# Patient Record
Sex: MALE | Race: WHITE | NOT HISPANIC OR LATINO | URBAN - METROPOLITAN AREA
[De-identification: names, ages, dates, MRNs, and addresses within clinical notes are randomized per-mention and may not be internally consistent; named-entity substitution may affect disease eponyms.]

---

## 2017-05-22 PROBLEM — D12.6 TUBULAR ADENOMA OF COLON: Status: ACTIVE | Noted: 2017-05-22

## 2017-08-10 ENCOUNTER — APPOINTMENT (RX ONLY)
Dept: URBAN - METROPOLITAN AREA CLINIC 24 | Facility: CLINIC | Age: 67
Setting detail: DERMATOLOGY
End: 2017-08-10

## 2017-08-10 DIAGNOSIS — L82.1 OTHER SEBORRHEIC KERATOSIS: ICD-10-CM

## 2017-08-10 DIAGNOSIS — D18.0 HEMANGIOMA: ICD-10-CM

## 2017-08-10 DIAGNOSIS — L57.0 ACTINIC KERATOSIS: ICD-10-CM

## 2017-08-10 DIAGNOSIS — L81.4 OTHER MELANIN HYPERPIGMENTATION: ICD-10-CM

## 2017-08-10 DIAGNOSIS — D22 MELANOCYTIC NEVI: ICD-10-CM

## 2017-08-10 PROBLEM — D22.5 MELANOCYTIC NEVI OF TRUNK: Status: ACTIVE | Noted: 2017-08-10

## 2017-08-10 PROBLEM — L85.3 XEROSIS CUTIS: Status: ACTIVE | Noted: 2017-08-10

## 2017-08-10 PROBLEM — D18.01 HEMANGIOMA OF SKIN AND SUBCUTANEOUS TISSUE: Status: ACTIVE | Noted: 2017-08-10

## 2017-08-10 PROCEDURE — 99213 OFFICE O/P EST LOW 20 MIN: CPT | Mod: 25

## 2017-08-10 PROCEDURE — 17000 DESTRUCT PREMALG LESION: CPT

## 2017-08-10 PROCEDURE — 17003 DESTRUCT PREMALG LES 2-14: CPT

## 2017-08-10 PROCEDURE — ? COUNSELING

## 2017-08-10 PROCEDURE — ? LIQUID NITROGEN

## 2017-08-10 ASSESSMENT — LOCATION SIMPLE DESCRIPTION DERM
LOCATION SIMPLE: LEFT SHOULDER
LOCATION SIMPLE: RIGHT THIGH
LOCATION SIMPLE: ABDOMEN
LOCATION SIMPLE: RIGHT LOWER BACK
LOCATION SIMPLE: RIGHT SHOULDER
LOCATION SIMPLE: LEFT THIGH
LOCATION SIMPLE: LEFT UPPER BACK
LOCATION SIMPLE: RIGHT HAND

## 2017-08-10 ASSESSMENT — LOCATION DETAILED DESCRIPTION DERM
LOCATION DETAILED: RIGHT SUPERIOR MEDIAL MIDBACK
LOCATION DETAILED: RIGHT POSTERIOR SHOULDER
LOCATION DETAILED: RIGHT ANTERIOR DISTAL THIGH
LOCATION DETAILED: LEFT MEDIAL UPPER BACK
LOCATION DETAILED: LEFT ANTERIOR DISTAL THIGH
LOCATION DETAILED: LEFT POSTERIOR SHOULDER
LOCATION DETAILED: RIGHT RADIAL DORSAL HAND
LOCATION DETAILED: LEFT LATERAL ABDOMEN

## 2017-08-10 ASSESSMENT — LOCATION ZONE DERM
LOCATION ZONE: ARM
LOCATION ZONE: LEG
LOCATION ZONE: TRUNK
LOCATION ZONE: HAND

## 2017-08-10 NOTE — PROCEDURE: LIQUID NITROGEN
Render Post-Care Instructions In Note?: no
Post-Care Instructions: I reviewed with the patient in detail post-care instructions. Patient is to wear sunprotection, and avoid picking at any of the treated lesions. Pt may apply Vaseline to crusted or scabbing areas. Will re-check at follow up
Number Of Freeze-Thaw Cycles: 1 freeze-thaw cycle
Consent: The patient's verbal consent was obtained including but not limited to risks of crusting, scabbing, blistering, scarring, darker or lighter pigmentary change, recurrence, incomplete removal and infection.
Detail Level: Detailed
Duration Of Freeze Thaw-Cycle (Seconds): 5

## 2018-05-10 ENCOUNTER — APPOINTMENT (RX ONLY)
Dept: URBAN - METROPOLITAN AREA CLINIC 24 | Facility: CLINIC | Age: 68
Setting detail: DERMATOLOGY
End: 2018-05-10

## 2018-05-10 DIAGNOSIS — L81.4 OTHER MELANIN HYPERPIGMENTATION: ICD-10-CM

## 2018-05-10 DIAGNOSIS — D18.0 HEMANGIOMA: ICD-10-CM

## 2018-05-10 DIAGNOSIS — L82.1 OTHER SEBORRHEIC KERATOSIS: ICD-10-CM

## 2018-05-10 PROBLEM — D18.01 HEMANGIOMA OF SKIN AND SUBCUTANEOUS TISSUE: Status: ACTIVE | Noted: 2018-05-10

## 2018-05-10 PROCEDURE — 99214 OFFICE O/P EST MOD 30 MIN: CPT

## 2018-05-10 PROCEDURE — ? COUNSELING

## 2018-05-10 ASSESSMENT — LOCATION SIMPLE DESCRIPTION DERM
LOCATION SIMPLE: RIGHT SHOULDER
LOCATION SIMPLE: RIGHT HAND
LOCATION SIMPLE: UPPER BACK
LOCATION SIMPLE: ABDOMEN
LOCATION SIMPLE: LEFT SHOULDER
LOCATION SIMPLE: LEFT HAND
LOCATION SIMPLE: RIGHT FOREARM
LOCATION SIMPLE: LEFT FOREARM

## 2018-05-10 ASSESSMENT — LOCATION ZONE DERM
LOCATION ZONE: TRUNK
LOCATION ZONE: HAND
LOCATION ZONE: ARM

## 2018-05-10 ASSESSMENT — LOCATION DETAILED DESCRIPTION DERM
LOCATION DETAILED: LEFT POSTERIOR SHOULDER
LOCATION DETAILED: RIGHT PROXIMAL DORSAL FOREARM
LOCATION DETAILED: LEFT PROXIMAL DORSAL FOREARM
LOCATION DETAILED: INFERIOR THORACIC SPINE
LOCATION DETAILED: LEFT LATERAL ABDOMEN
LOCATION DETAILED: LEFT ULNAR DORSAL HAND
LOCATION DETAILED: RIGHT RADIAL DORSAL HAND
LOCATION DETAILED: RIGHT POSTERIOR SHOULDER

## 2018-08-29 PROBLEM — K57.30 DIVERTICULOSIS OF LARGE INTESTINE WITHOUT HEMORRHAGE: Status: ACTIVE | Noted: 2018-08-29

## 2018-08-29 PROBLEM — K59.01 SLOW TRANSIT CONSTIPATION: Status: ACTIVE | Noted: 2018-08-29

## 2018-08-29 PROBLEM — Z71.85 VACCINE COUNSELING: Status: ACTIVE | Noted: 2018-08-29

## 2018-12-26 ENCOUNTER — ANESTHESIA EVENT (OUTPATIENT)
Dept: SURGERY | Age: 68
End: 2018-12-26
Payer: COMMERCIAL

## 2018-12-26 RX ORDER — PHENYLEPHRINE HYDROCHLORIDE 25 MG/ML
1 SOLUTION/ DROPS OPHTHALMIC
Status: CANCELLED | OUTPATIENT
Start: 2018-12-26 | End: 2018-12-26

## 2018-12-26 RX ORDER — CYCLOPENTOLATE HYDROCHLORIDE 20 MG/ML
1 SOLUTION/ DROPS OPHTHALMIC
Status: CANCELLED | OUTPATIENT
Start: 2018-12-26 | End: 2018-12-26

## 2018-12-27 ENCOUNTER — HOSPITAL ENCOUNTER (OUTPATIENT)
Age: 68
Setting detail: OUTPATIENT SURGERY
Discharge: HOME | End: 2018-12-27
Attending: OPHTHALMOLOGY | Admitting: OPHTHALMOLOGY
Payer: COMMERCIAL

## 2018-12-27 ENCOUNTER — ANESTHESIA (OUTPATIENT)
Dept: SURGERY | Age: 68
End: 2018-12-27
Payer: COMMERCIAL

## 2018-12-27 VITALS
DIASTOLIC BLOOD PRESSURE: 86 MMHG | RESPIRATION RATE: 12 BRPM | BODY MASS INDEX: 25.66 KG/M2 | WEIGHT: 184 LBS | TEMPERATURE: 98.2 F | SYSTOLIC BLOOD PRESSURE: 142 MMHG | OXYGEN SATURATION: 98 % | HEART RATE: 68 BPM

## 2018-12-27 PROCEDURE — 77030018846 HC SOL IRR STRL H20 ICUM -A: Performed by: OPHTHALMOLOGY

## 2018-12-27 PROCEDURE — 77030017621 HC NDL OPHTH1 ALCN -B: Performed by: OPHTHALMOLOGY

## 2018-12-27 PROCEDURE — 77030008546 HC TBNG OPHTH ALCN -B: Performed by: OPHTHALMOLOGY

## 2018-12-27 PROCEDURE — 74011000250 HC RX REV CODE- 250: Performed by: OPHTHALMOLOGY

## 2018-12-27 PROCEDURE — 77030018837 HC SOL IRR OPTH ALCN -A: Performed by: OPHTHALMOLOGY

## 2018-12-27 PROCEDURE — 76060000033 HC ANESTHESIA 1 TO 1.5 HR: Performed by: OPHTHALMOLOGY

## 2018-12-27 PROCEDURE — 77030018838 HC SOL IRR OPTH ALCN -B: Performed by: OPHTHALMOLOGY

## 2018-12-27 PROCEDURE — 77030012829 HC CAUT BPLR KIRW -B: Performed by: OPHTHALMOLOGY

## 2018-12-27 PROCEDURE — 74011250636 HC RX REV CODE- 250/636: Performed by: ANESTHESIOLOGY

## 2018-12-27 PROCEDURE — 77030003029 HC SUT VCRL J&J -B: Performed by: OPHTHALMOLOGY

## 2018-12-27 PROCEDURE — 76210000063 HC OR PH I REC FIRST 0.5 HR: Performed by: OPHTHALMOLOGY

## 2018-12-27 PROCEDURE — 74011250636 HC RX REV CODE- 250/636: Performed by: OPHTHALMOLOGY

## 2018-12-27 PROCEDURE — 76210000020 HC REC RM PH II FIRST 0.5 HR: Performed by: OPHTHALMOLOGY

## 2018-12-27 PROCEDURE — 76010000149 HC OR TIME 1 TO 1.5 HR: Performed by: OPHTHALMOLOGY

## 2018-12-27 PROCEDURE — 74011250636 HC RX REV CODE- 250/636

## 2018-12-27 PROCEDURE — 77030032623 HC KT VITRCMY TOT PLS ALCN -F: Performed by: OPHTHALMOLOGY

## 2018-12-27 PROCEDURE — 77030008547 HC TBNG OPHTH BD -A: Performed by: OPHTHALMOLOGY

## 2018-12-27 RX ORDER — SODIUM CHLORIDE 0.9 % (FLUSH) 0.9 %
5-10 SYRINGE (ML) INJECTION AS NEEDED
Status: DISCONTINUED | OUTPATIENT
Start: 2018-12-27 | End: 2018-12-27 | Stop reason: HOSPADM

## 2018-12-27 RX ORDER — PROPOFOL 10 MG/ML
INJECTION, EMULSION INTRAVENOUS AS NEEDED
Status: DISCONTINUED | OUTPATIENT
Start: 2018-12-27 | End: 2018-12-27 | Stop reason: HOSPADM

## 2018-12-27 RX ORDER — LIDOCAINE HYDROCHLORIDE 10 MG/ML
0.1 INJECTION INFILTRATION; PERINEURAL AS NEEDED
Status: DISCONTINUED | OUTPATIENT
Start: 2018-12-27 | End: 2018-12-27 | Stop reason: HOSPADM

## 2018-12-27 RX ORDER — BUPIVACAINE HYDROCHLORIDE 5 MG/ML
INJECTION, SOLUTION EPIDURAL; INTRACAUDAL AS NEEDED
Status: DISCONTINUED | OUTPATIENT
Start: 2018-12-27 | End: 2018-12-27 | Stop reason: HOSPADM

## 2018-12-27 RX ORDER — OXYCODONE HYDROCHLORIDE 5 MG/1
5 TABLET ORAL
Status: DISCONTINUED | OUTPATIENT
Start: 2018-12-27 | End: 2018-12-27 | Stop reason: HOSPADM

## 2018-12-27 RX ORDER — CYCLOPENTOLATE HYDROCHLORIDE 20 MG/ML
1 SOLUTION/ DROPS OPHTHALMIC
Status: COMPLETED | OUTPATIENT
Start: 2018-12-27 | End: 2018-12-27

## 2018-12-27 RX ORDER — MIDAZOLAM HYDROCHLORIDE 1 MG/ML
2 INJECTION, SOLUTION INTRAMUSCULAR; INTRAVENOUS
Status: DISCONTINUED | OUTPATIENT
Start: 2018-12-27 | End: 2018-12-27 | Stop reason: HOSPADM

## 2018-12-27 RX ORDER — BETAMETHASONE SODIUM PHOSPHATE AND BETAMETHASONE ACETATE 3; 3 MG/ML; MG/ML
INJECTION, SUSPENSION INTRA-ARTICULAR; INTRALESIONAL; INTRAMUSCULAR; SOFT TISSUE AS NEEDED
Status: DISCONTINUED | OUTPATIENT
Start: 2018-12-27 | End: 2018-12-27 | Stop reason: HOSPADM

## 2018-12-27 RX ORDER — SODIUM CHLORIDE, SODIUM LACTATE, POTASSIUM CHLORIDE, CALCIUM CHLORIDE 600; 310; 30; 20 MG/100ML; MG/100ML; MG/100ML; MG/100ML
75 INJECTION, SOLUTION INTRAVENOUS CONTINUOUS
Status: DISCONTINUED | OUTPATIENT
Start: 2018-12-27 | End: 2018-12-27 | Stop reason: HOSPADM

## 2018-12-27 RX ORDER — PHENYLEPHRINE HYDROCHLORIDE 25 MG/ML
1 SOLUTION/ DROPS OPHTHALMIC
Status: COMPLETED | OUTPATIENT
Start: 2018-12-27 | End: 2018-12-27

## 2018-12-27 RX ORDER — ALBUTEROL SULFATE 0.83 MG/ML
2.5 SOLUTION RESPIRATORY (INHALATION) AS NEEDED
Status: DISCONTINUED | OUTPATIENT
Start: 2018-12-27 | End: 2018-12-27 | Stop reason: HOSPADM

## 2018-12-27 RX ORDER — SODIUM CHLORIDE 0.9 % (FLUSH) 0.9 %
5-10 SYRINGE (ML) INJECTION EVERY 8 HOURS
Status: DISCONTINUED | OUTPATIENT
Start: 2018-12-27 | End: 2018-12-27 | Stop reason: HOSPADM

## 2018-12-27 RX ORDER — CEFAZOLIN SODIUM 1 G/3ML
INJECTION, POWDER, FOR SOLUTION INTRAMUSCULAR; INTRAVENOUS AS NEEDED
Status: DISCONTINUED | OUTPATIENT
Start: 2018-12-27 | End: 2018-12-27 | Stop reason: HOSPADM

## 2018-12-27 RX ORDER — HYDROMORPHONE HYDROCHLORIDE 2 MG/ML
0.5 INJECTION, SOLUTION INTRAMUSCULAR; INTRAVENOUS; SUBCUTANEOUS
Status: DISCONTINUED | OUTPATIENT
Start: 2018-12-27 | End: 2018-12-27 | Stop reason: HOSPADM

## 2018-12-27 RX ORDER — MIDAZOLAM HYDROCHLORIDE 1 MG/ML
2 INJECTION, SOLUTION INTRAMUSCULAR; INTRAVENOUS ONCE
Status: DISCONTINUED | OUTPATIENT
Start: 2018-12-27 | End: 2018-12-27 | Stop reason: HOSPADM

## 2018-12-27 RX ORDER — FENTANYL CITRATE 50 UG/ML
100 INJECTION, SOLUTION INTRAMUSCULAR; INTRAVENOUS ONCE
Status: DISCONTINUED | OUTPATIENT
Start: 2018-12-27 | End: 2018-12-27 | Stop reason: HOSPADM

## 2018-12-27 RX ORDER — LIDOCAINE HYDROCHLORIDE 20 MG/ML
INJECTION, SOLUTION INFILTRATION; PERINEURAL AS NEEDED
Status: DISCONTINUED | OUTPATIENT
Start: 2018-12-27 | End: 2018-12-27 | Stop reason: HOSPADM

## 2018-12-27 RX ORDER — SODIUM CHLORIDE 9 MG/ML
INJECTION INTRAMUSCULAR; INTRAVENOUS; SUBCUTANEOUS AS NEEDED
Status: DISCONTINUED | OUTPATIENT
Start: 2018-12-27 | End: 2018-12-27 | Stop reason: HOSPADM

## 2018-12-27 RX ORDER — SODIUM CHLORIDE, SODIUM LACTATE, POTASSIUM CHLORIDE, CALCIUM CHLORIDE 600; 310; 30; 20 MG/100ML; MG/100ML; MG/100ML; MG/100ML
50 INJECTION, SOLUTION INTRAVENOUS CONTINUOUS
Status: DISCONTINUED | OUTPATIENT
Start: 2018-12-27 | End: 2018-12-27 | Stop reason: HOSPADM

## 2018-12-27 RX ADMIN — PROPOFOL 50 MG: 10 INJECTION, EMULSION INTRAVENOUS at 15:23

## 2018-12-27 RX ADMIN — PROPOFOL 10 MG: 10 INJECTION, EMULSION INTRAVENOUS at 14:33

## 2018-12-27 RX ADMIN — PROPOFOL 50 MG: 10 INJECTION, EMULSION INTRAVENOUS at 14:28

## 2018-12-27 RX ADMIN — PROPOFOL 20 MG: 10 INJECTION, EMULSION INTRAVENOUS at 14:31

## 2018-12-27 RX ADMIN — SODIUM CHLORIDE, SODIUM LACTATE, POTASSIUM CHLORIDE, AND CALCIUM CHLORIDE 75 ML/HR: 600; 310; 30; 20 INJECTION, SOLUTION INTRAVENOUS at 12:58

## 2018-12-27 RX ADMIN — CYCLOPENTOLATE HYDROCHLORIDE 1 DROP: 20 SOLUTION/ DROPS OPHTHALMIC at 13:19

## 2018-12-27 RX ADMIN — PHENYLEPHRINE HYDROCHLORIDE 1 DROP: 25 SOLUTION/ DROPS OPHTHALMIC at 13:41

## 2018-12-27 RX ADMIN — CYCLOPENTOLATE HYDROCHLORIDE 1 DROP: 20 SOLUTION/ DROPS OPHTHALMIC at 12:58

## 2018-12-27 RX ADMIN — CYCLOPENTOLATE HYDROCHLORIDE 1 DROP: 20 SOLUTION/ DROPS OPHTHALMIC at 13:41

## 2018-12-27 RX ADMIN — PROPOFOL 30 MG: 10 INJECTION, EMULSION INTRAVENOUS at 14:32

## 2018-12-27 RX ADMIN — PHENYLEPHRINE HYDROCHLORIDE 1 DROP: 25 SOLUTION/ DROPS OPHTHALMIC at 13:19

## 2018-12-27 RX ADMIN — PROPOFOL 30 MG: 10 INJECTION, EMULSION INTRAVENOUS at 14:30

## 2018-12-27 RX ADMIN — PHENYLEPHRINE HYDROCHLORIDE 1 DROP: 25 SOLUTION/ DROPS OPHTHALMIC at 12:58

## 2018-12-27 RX ADMIN — PROPOFOL 50 MG: 10 INJECTION, EMULSION INTRAVENOUS at 14:29

## 2018-12-27 NOTE — OP NOTES
[facility] REPORT    Sandra Gerardo  MR#: [id number]  : 1950  ACCOUNT #: [de-identified]   DATE OF SERVICE: 2018    PREOPERATIVE DIAGNOSES:  1. Rhegmatogenous retinal detachment, macula on, left eye. 2.  Vitreous hemorrhage, left eye. 3.  Pseudophakia. POSTOPERATIVE DIAGNOSES:  1. Rhegmatogenous retinal detachment, macula on, left eye. 2.  Vitreous hemorrhage, left eye. 3.  Pseudophakia. PROCEDURE PERFORMED:  Pars plana vitrectomy to repair retinal detachment with vitrectomy, laser and gas fluid exchange, total of approximately 758 spots. SURGEON:  Hoang Brown MD    ASSISTANT:  0    ANESTHESIA:  Modified. COMPLICATIONS:  None. ESTIMATED BLOOD LOSS:  0    SPECIMENS REMOVED:  0    IMPLANTS:  0     INDICATIONS FOR THE PROCEDURE:  The patient had significant symptomatic vision loss associated with vitreous hemorrhage. He also had superonasal detachment. He had previous laser, superotemporal tear. There was some obscuration of the inferior retina with some concern regarding additional break formation due to the presence of inferior detachment. This had developed in his fellow eye, requiring scleral buckling. After discussing the options, risks and benefits, I felt the best course of action was to proceed with vitrectomy with gas fluid exchange to both release the hemorrhage, repair the detachment as well as the laser 360 degrees. The patient understood the risks and benefits and alternatives and elected to proceed. After adequate pre-evaluation and informed consent obtained, the patient was brought to the operative suite. IV access and sedation was obtained without difficulty. Modified [place] and retrobulbar injection was given.   Following preoperative timeout verifying the left eye as the operative site and an indirect ophthalmoscopy confirmed the presence of the preexisting hemorrhage with the supranasal detachment was approximately 2 clock  hours extending moderately posterior to the equator. The eye was prepped and draped in the usual fashion for retinal surgery. Additional subtenons anesthetic was given to the patient. Three separate 25-gauge cannulas were placed. Infusion verified and turned to 35 mmHg. Through these, the central vitreous removed, the hyaloid was already elevated. The blood was removed and the vitreous skirt was trimmed. Nerve and macula were healthy. Once the skirt had been trimmed out to the far anterior periphery, indirect ophthalmoscopy performed additional trimming. As the blood was removed inferiorly, the long spins flap tear at 6 o'clock as well as a smaller tear at approximately 5, there was no significant fluid associated with these. Traction was released from these as well as from the superior breaks. Once good traction was achieved and no additional breaks or tears could be seen with depressed examination, a laser was initially placed around the 2 inferior defects as well as in the periphery for 360 degrees. A single retinotomy was created at the high end of the detachment which extended from approximately 10 o'clock to 12 o'clock at the level of the equator and drainage of the subretinal fluid was achieved with fluid air exchange. Laser was then placed around the retinotomy as well as in the periphery for 360 degrees to treat the tear superiorly, the other 2 breaks in the areas of detachment, a single tear at 11 and operculated hole at approximately 10:30. Once good laser been achieved, one final drainage performed. A 40 mL exchange of 20% SF6 was carried out and the eye was left with a pressure of under 20 mmHg at the end the procedure after closing all sclerotomies with 8-0 Vicryl sutures. Subtenons Celestone and Ancef was given infranasally. The eye was patched with Maxitrol ointment as well as Alphagan drops.   He was taken to recovery in good condition after placement of the patch and shield. It should be noted he did become somewhat anxious as I was closing and so we did give an extra dose of propofol, but there were no issues associated with this.       Waynetta Sacks, MD [address] / [place]  D: 12/27/2018 15:42     T: 12/27/2018 16:52  JOB #: [id number]

## 2018-12-27 NOTE — BRIEF OP NOTE
BRIEF OPERATIVE NOTE    Date of Procedure: 12/27/2018   Preoperative Diagnosis: Retinal detachment of left eye with single break [H33.012]  Postoperative Diagnosis: Retinal detachment of left eye with single break [H33.012]    Procedure(s):  LEFT VITRECTOMY POSTERIOR 25 GAUGE/LASER  LEFT EYE GAS INJECTION/GAS EXCHANGE  Surgeon(s) and Role:     * Jaime Manjarrez MD - Primary         Surgical Assistant: 0    Surgical Staff:  Circ-1: Adan Leal RN  Circ-2: Lisa Quiñones RN  Scrub Tech-1: [place] [employer]  Event Time In Time Out   Incision Start 1439    Incision Close 1527      Anesthesia: MAC   Estimated Blood Loss: 0  Specimens: * No specimens in log *   Findings: 0   Complications: 0  Implants: * No implants in log *

## 2018-12-27 NOTE — ANESTHESIA POSTPROCEDURE EVALUATION
Procedure(s):  LEFT VITRECTOMY POSTERIOR 25 GAUGE/LASER  LEFT EYE GAS INJECTION/GAS EXCHANGE.     Anesthesia Post Evaluation        Patient location during evaluation: PACU  Patient participation: complete - patient participated  Level of consciousness: awake  Pain management: adequate  Airway patency: patent  Anesthetic complications: no  Cardiovascular status: acceptable  Respiratory status: spontaneous ventilation and acceptable  Hydration status: acceptable        Visit Vitals  /86   Pulse 68   Temp 36.8 °C (98.2 °F)   Resp 12   Wt 83.5 kg (184 lb)   SpO2 98%   BMI 25.66 kg/m²

## 2018-12-27 NOTE — ANESTHESIA PREPROCEDURE EVALUATION
Anesthetic History   No history of anesthetic complications            Review of Systems / Medical History  Patient summary reviewed and pertinent labs reviewed    Pulmonary  Within defined limits                 Neuro/Psych   Within defined limits           Cardiovascular              Hyperlipidemia    Exercise tolerance: >4 METS     GI/Hepatic/Renal  Within defined limits              Endo/Other  Within defined limits           Other Findings              Physical Exam    Airway  Mallampati: II  TM Distance: 4 - 6 cm  Neck ROM: normal range of motion   Mouth opening: Normal     Cardiovascular    Rhythm: regular  Rate: normal         Dental  No notable dental hx       Pulmonary  Breath sounds clear to auscultation               Abdominal         Other Findings            Anesthetic Plan    ASA: 1  Anesthesia type: total IV anesthesia          Induction: Intravenous  Anesthetic plan and risks discussed with: Patient and Spouse

## 2018-12-27 NOTE — DISCHARGE INSTRUCTIONS
[facility], [address] MD Chastity Guzman MD Murleen Mo. MD Jeimy Lobolla Pan. Madeleine Loaiza MD    [phone] or [phone] or [phone] or [phone]    Post Operative Instructions for Retina and Vitreous Surgery Patients    The following are a few guidelines that you should observe for two weeks after surgery:    1. Avoid stooping over, lifting heavy weights or bumping your head. 2.  Special positioning: Stay off back, sleep on left side looking down. 3.  No extensive traveling except what is necessary. Avoid air travel until you consult your doctor. 4.  Men may shave after the third day. 5.  You may watch television, read, cook and wash dishes as long as it does not interfere        with special positioning instructions. 6.  Alcoholic beverages may be used in moderation. 7.  No sexual intercourse. 8.  You  may bathe the eyelids daily with cotton or a tissue moistened with warm tap       water. Do not bathe the eyeball itself. 9.  Some discharge from the operated eye is to be expected. This should gradually        improve. 10. Change the eye pad at least once daily. You may discontinue the eye pad whenever        comfortable to do so (usually three days after the operation). Wear the eye shield or        glasses at all times. 11. If you experience increasing pain, decreasing vision, or pus like discharge, call the        Office. 12. Medication Instructions:         Prednisolone 1% - one drop in the operated eye 4 times a day. Ofloxacin(antibiotic drop) - one drop in operated eye 4 times a day. BRING ALL EYE DROPS TO YOU POSTOPERATIVE APPOINTMENT! Voiced or demonstrated understanding:  YES    TYPICAL SIDE EFFECTS OF PAIN MEDICATION:  *    Constipation: Drink lots of fluids, try prune juice. OTC stool softener if needed. *    Nausea: Take pain medication with food. ACTIVITY  · As tolerated and as directed by your doctor. · Bathe or shower as directed by your doctor. DIET  · Day of surgery: Clear liquids until no nausea or vomiting; small portion, light diet [place] foods (ex: baked chicken, plain rice, grits, scrambled eggs, toast). Nothing greasy, fried or spicy today. · Advance to regular diet on second day, unless your doctor orders otherwise. · If nausea and vomiting continues, call your doctor. PAIN  · Take pain medication as directed by your doctor. · DO NOT take aspirin or blood thinners unless directed by your doctor. AFTER ANESTHESIA   · For the first 24 hours: DO NOT Drive, Drink alcoholic beverages, or Make important decisions. · Be aware of dizziness following anesthesia and while taking pain medication. DISCHARGE SUMMARY from Nurse    PATIENT INSTRUCTIONS:    After general anesthesia or intravenous sedation, for 24 hours or while taking prescription Narcotics:  · Limit your activities  · Do not drive and operate hazardous machinery  · Do not make important personal or business decisions  · Do  not drink alcoholic beverages  · If you have not urinated within 8 hours after discharge, please contact your surgeon on call. *  Please give a list of your current medications to your Primary Care Provider. *  Please update this list whenever your medications are discontinued, doses are      changed, or new medications (including over-the-counter products) are added. *  Please carry medication information at all times in case of emergency situations. Preventing Infection at Home  We care about preventing infection and avoiding the spread of germs - not only when you are in the hospital but also when you return home.  When you return home from the hospital, its important to take the following steps to help prevent infection and avoid spreading germs that could infect you and others. Ask everyone in your home to follow these guidelines, too. Clean Your Hands  · Clean your hands whenever your hands are visibly dirty, before you eat, before or after touching your mouth, nose or eyes, and before preparing food. Clean them after contact with body fluids, using the restroom, touching animals or changing diapers. · When washing hands, wet them with warm water and work up a lather. Rub hands for at least 15 seconds, then rinse them and pat them dry with a clean towel or paper towel. · When using hand sanitizers, it should take about 15 seconds to rub your hands dry. If not, you probably didnt apply enough . Cover Your Sneeze or Cough  Germs are released into the air whenever you sneeze or cough. To prevent the spread of infection:  · Turn away from other people before coughing or sneezing. · Cover your mouth or nose with a tissue when you cough or sneeze. Put the tissue in the trash. · If you dont have a tissue, cough or sneeze into your upper sleeve, not your hands. · Always clean your hands after coughing or sneezing. Care for Wounds  Your skin is your bodys first line of defense against germs, but an open wound leaves an easy way for germs to enter your body. To prevent infection:  · Clean your hands before and after changing wound dressings, and wear gloves to change dressings if recommended by your doctor. · Take special care with IV lines or other devices inserted into the body. If you must touch them, clean your hands first.  · Follow any specific instructions from your doctor to care for your wounds. Contact your doctor if you experience any signs of infection, such as fever or increased redness at the surgical or wound site. Keep a Clean Home  · Clean or wipe commonly touched hard surfaces like door handles, sinks, tabletops, phones and TV remotes. · Use products labeled disinfectant to kill harmful bacteria and viruses.   · Use a clean cloth or paper towel to clean and dry surfaces. Wiping surfaces with a dirty dishcloth, sponge or towel will only spread germs. · Never share toothbrushes, orellana, drinking glasses, utensils, razor blades, face cloths or bath towels to avoid spreading germs. · Be sure that the linens that you sleep on are clean. · Keep pets away from wounds and wash your hands after touching pets, their toys or bedding. We care about you and your health. Remember, preventing infections is a team effort between you, your family, friends and health care providers. These are general instructions for a healthy lifestyle:    No smoking/ No tobacco products/ Avoid exposure to second hand smoke    Surgeon General's Warning:  Quitting smoking now greatly reduces serious risk to your health. Obesity, smoking, and sedentary lifestyle greatly increases your risk for illness    A healthy diet, regular physical exercise & weight monitoring are important for maintaining a healthy lifestyle    You may be retaining fluid if you have a history of heart failure or if you experience any of the following symptoms:  Weight gain of 3 pounds or more overnight or 5 pounds in a week, increased swelling in our hands or feet or shortness of breath while lying flat in bed. Please call your doctor as soon as you notice any of these symptoms; do not wait until your next office visit. Recognize signs and symptoms of STROKE:    F-face looks uneven    A-arms unable to move or move unevenly    S-speech slurred or non-existent    T-time-call 911 as soon as signs and symptoms begin-DO NOT go       Back to bed or wait to see if you get better-TIME IS BRAIN.

## 2019-05-09 ENCOUNTER — APPOINTMENT (RX ONLY)
Dept: URBAN - METROPOLITAN AREA CLINIC 24 | Facility: CLINIC | Age: 69
Setting detail: DERMATOLOGY
End: 2019-05-09

## 2019-05-09 DIAGNOSIS — L82.1 OTHER SEBORRHEIC KERATOSIS: ICD-10-CM

## 2019-05-09 DIAGNOSIS — D18.0 HEMANGIOMA: ICD-10-CM

## 2019-05-09 DIAGNOSIS — L72.8 OTHER FOLLICULAR CYSTS OF THE SKIN AND SUBCUTANEOUS TISSUE: ICD-10-CM

## 2019-05-09 DIAGNOSIS — L57.0 ACTINIC KERATOSIS: ICD-10-CM

## 2019-05-09 DIAGNOSIS — D22 MELANOCYTIC NEVI: ICD-10-CM

## 2019-05-09 DIAGNOSIS — L81.4 OTHER MELANIN HYPERPIGMENTATION: ICD-10-CM

## 2019-05-09 PROBLEM — D22.5 MELANOCYTIC NEVI OF TRUNK: Status: ACTIVE | Noted: 2019-05-09

## 2019-05-09 PROBLEM — L85.3 XEROSIS CUTIS: Status: ACTIVE | Noted: 2019-05-09

## 2019-05-09 PROBLEM — L55.1 SUNBURN OF SECOND DEGREE: Status: ACTIVE | Noted: 2019-05-09

## 2019-05-09 PROBLEM — D18.01 HEMANGIOMA OF SKIN AND SUBCUTANEOUS TISSUE: Status: ACTIVE | Noted: 2019-05-09

## 2019-05-09 PROCEDURE — 17000 DESTRUCT PREMALG LESION: CPT

## 2019-05-09 PROCEDURE — 17003 DESTRUCT PREMALG LES 2-14: CPT

## 2019-05-09 PROCEDURE — ? COUNSELING

## 2019-05-09 PROCEDURE — 99214 OFFICE O/P EST MOD 30 MIN: CPT | Mod: 25

## 2019-05-09 PROCEDURE — ? LIQUID NITROGEN

## 2019-05-09 ASSESSMENT — LOCATION ZONE DERM
LOCATION ZONE: AXILLAE
LOCATION ZONE: ARM
LOCATION ZONE: TRUNK
LOCATION ZONE: HAND

## 2019-05-09 ASSESSMENT — LOCATION DETAILED DESCRIPTION DERM
LOCATION DETAILED: RIGHT RADIAL DORSAL HAND
LOCATION DETAILED: LEFT PROXIMAL DORSAL FOREARM
LOCATION DETAILED: LEFT MEDIAL UPPER BACK
LOCATION DETAILED: RIGHT DISTAL DORSAL FOREARM
LOCATION DETAILED: LEFT DISTAL DORSAL FOREARM
LOCATION DETAILED: RIGHT POSTERIOR SHOULDER
LOCATION DETAILED: RIGHT PROXIMAL DORSAL FOREARM
LOCATION DETAILED: LEFT LATERAL ABDOMEN
LOCATION DETAILED: LEFT POSTERIOR SHOULDER
LOCATION DETAILED: LEFT AXILLARY VAULT
LOCATION DETAILED: LEFT ULNAR DORSAL HAND
LOCATION DETAILED: RIGHT DORSAL INDEX METACARPOPHALANGEAL JOINT

## 2019-05-09 ASSESSMENT — LOCATION SIMPLE DESCRIPTION DERM
LOCATION SIMPLE: RIGHT FOREARM
LOCATION SIMPLE: ABDOMEN
LOCATION SIMPLE: LEFT UPPER BACK
LOCATION SIMPLE: LEFT HAND
LOCATION SIMPLE: RIGHT SHOULDER
LOCATION SIMPLE: RIGHT HAND
LOCATION SIMPLE: LEFT SHOULDER
LOCATION SIMPLE: LEFT FOREARM
LOCATION SIMPLE: LEFT AXILLARY VAULT

## 2019-05-09 NOTE — PROCEDURE: LIQUID NITROGEN
Render Post-Care Instructions In Note?: no
Number Of Freeze-Thaw Cycles: 1 freeze-thaw cycle
Duration Of Freeze Thaw-Cycle (Seconds): 5
Post-Care Instructions: I reviewed with the patient in detail post-care instructions. Patient is to wear sunprotection, and avoid picking at any of the treated lesions. Pt may apply Vaseline to crusted or scabbing areas. Will re-check at follow up
Detail Level: Detailed
Consent: The patient's verbal consent was obtained including but not limited to risks of crusting, scabbing, blistering, scarring, darker or lighter pigmentary change, recurrence, incomplete removal and infection.

## 2019-06-06 ENCOUNTER — HOSPITAL ENCOUNTER (OUTPATIENT)
Dept: PHYSICAL THERAPY | Age: 69
Discharge: HOME | End: 2019-06-06
Payer: COMMERCIAL

## 2019-06-06 PROCEDURE — 97161 PT EVAL LOW COMPLEX 20 MIN: CPT

## 2019-06-06 NOTE — THERAPY EVALUATION
Victoria Hargrove  : 1950  Primary: [facility]yn [facility]  [place] Therapy Center at [facility]  [address] [place] [address]  [phone]   [phone]          OUTPATIENT PHYSICAL THERAPY:Initial Assessment 2019   ICD-10: Treatment Diagnosis: Cervicalgia, M54.2  Precautions/Allergies:   Celebrex [celecoxib] and Sulfa (sulfonamide antibiotics)   TREATMENT PLAN:  Effective Dates: 2019 TO 2019 (90 days). Frequency/Duration: 2 times a week for for 4 weeks then one time a week for 8 weeks MEDICAL/REFERRING DIAGNOSIS:  neck Facet arthropathy, DDD cervical region  DATE OF ONSET: chronic  REFERRING PHYSICIAN: Vicente Najera*  MD Orders: evaluate and treat  Return MD Appointment: 7/3/19     INITIAL ASSESSMENT:  Mr. Indu Day presents with cervical spine pain due to cervical joint limitation and restricted mobility. He shows significant loss of left rotation with increased pain down the right side of his upper back. He is a good candidate for skilled PT in order to address listed impairments affecting his function. Kelly Hernandez, PT, DPT, OCS      PROBLEM LIST (Impacting functional limitations):  1. Decreased Strength  2. Increased Pain  3. Decreased Activity Tolerance  4. Increased Fatigue  5. Decreased Flexibility/Joint Mobility INTERVENTIONS PLANNED: (Treatment may consist of any combination of the following)  1. Cold  2. Cryotherapy  3. Electrical Stimulation  4. Heat  5. Manual Therapy  6. Neuromuscular Re-education/Strengthening  7. Range of Motion (ROM)  8. Therapeutic Activites  9. Therapeutic Exercise/Strengthening  10. Traction     GOALS: (Goals have been discussed and agreed upon with patient.)  Short-Term Functional Goals: Time Frame: 4 weeks  1. Patient will show a greater than 5% improvement in left cervical rotation without pain increase  2.  Patient will show a greater than 1 finger increase in cervical flexion  3. Patient will be independent in [facility] for cervical mobility exercises  Discharge Goals: Time Frame: 12 weeks  1. Patient will report driving without pain increase in order to return to function  2. Patient will show a greater than 5 point decrease on the NDI in order to show an increase in function  3. Patient will return to golf without pain increase  4. Patient will be independent in cervical spine posture and stability training    OUTCOME MEASURE:   Tool Used: Neck Disability Index (NDI)  Score:  Initial: 7/50  Most Recent: X/50 (Date: -- )   Interpretation of Score: The Neck Disability Index is a revised form of the Oswestry Low Back Pain Index and is designed to measure the activities of daily living in person's with neck pain. Each section is scored on a 0-5 scale, 5 representing the greatest disability. The scores of each section are added together for a total score of 50. MEDICAL NECESSITY:   · Patient is expected to demonstrate progress in strength, range of motion and functional technique to decrease symptoms. .  · Patient demonstrates good rehab potential due to higher previous functional level. · Skilled intervention continues to be required due to increased pain. REASON FOR SERVICES/OTHER COMMENTS:  · Patient continues to require skilled intervention due to increased pain and dysfunction. Total Duration:  PT Patient Time In/Time Out  Time In: 1400  Time Out: 1500    Rehabilitation Potential For Stated Goals: Good  Regarding Mauri Pritchard's therapy, I certify that the treatment plan above will be carried out by a therapist or under their direction.   Thank you for this referral,  Adrian Brown, PT     Referring Physician Signature: [facility] D* _______________________________ Date _____________     PAIN/SUBJECTIVE:   Initial: Pain Intensity 1: 1 not much pain when not moving Post Session:  0/10   HISTORY:   History of Injury/Illness (Reason for Referral):  Patient reports chronic neck tightness with some pain, but started to have increased pain in early spring of this year with pain from the left shoulder blade into the neck. He feels it most with left rotation. He had previously had a fracture in his right shoulder of either the [facility] joint or shoulder itself that has limited some of his mobility, but not too much. X-rays shows increased disc degeneration in lower cervical spine. He does not report increased neurological signs or symptoms, but just a burning pain in the right side of his back and neck. The pain is inconsistent at this time. 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 Looking up also can cause his pain  Past Medical History/Comorbidities:   Mr. Saul Lima  has a past medical history of Detached retina (6/4/14), Detached retina (6/4/2014), Hyperlipidemia, and Tubular adenoma of colon (5/22/2017). Mr. Saul Lima  has a past surgical history that includes hx vein stripping (1982); hx colonoscopy (2008); hx knee arthroscopy (1999); hx cataract removal (12/09/2013); hx tonsillectomy; hx heent (1999); hx retinal detachment repair (Right, 5/6/14); and hx retinal detachment repair (02/05/2015). Social History/Living Environment:       Social History     Socioeconomic History    Marital status:      Spouse name: Not on file    Number of children: Not on file    Years of education: Not on file    Highest education level: Not on file   Occupational History    Not on file   Social Needs    Financial resource strain: Not on file    Food insecurity:     Worry: Not on file     Inability: Not on file    Transportation needs:     Medical: Not on file     Non-medical: Not on file   Tobacco Use    Smoking status: Never Smoker    Smokeless tobacco: Never Used   Substance and Sexual Activity    Alcohol use:  Yes     Alcohol/week: 8.0 oz     Types: 6 Cans of beer, 10 Shots of liquor per week    Drug use: No    Sexual activity: Not on file   Lifestyle    Physical activity:     Days per week: Not on file     Minutes per session: Not on file    Stress: Not on file   Relationships    Social connections:     Talks on phone: Not on file     Gets together: Not on file     Attends Buddhist service: Not on file     Active member of club or organization: Not on file     Attends meetings of clubs or organizations: Not on file     Relationship status: Not on file    Intimate partner violence:     Fear of current or ex partner: Not on file     Emotionally abused: Not on file     Physically abused: Not on file     Forced sexual activity: Not on file   Other Topics Concern    Not on file   Social History Narrative    Not on file         Prior Level of Function/Work/Activity:  Independent, retired  Dominant Side:         RIGHT   Ambulatory/Rehab Services H2 Model Falls Risk Assessment   Risk Factors:       (1)  Gender [Male] Ability to Rise from Chair:       (0)  Ability to rise in a single movement   Falls Prevention Plan:       No modifications necessary   Total: (5 or greater = High Risk): 1   ©2010 [facility] . [facility] States Patent #0,[id number]. Federal Law prohibits the replication, distribution or use without written permission from [facility] [employer]   Current Medications:       Current Outpatient Medications:     diclofenac (VOLTAREN) 1 % gel, Apply 4 g to affected area four (4) times daily. , Disp: 100 g, Rfl: 1    montelukast (SINGULAIR) 10 mg tablet, Take 1 Tab by mouth daily. , Disp: 90 Tab, Rfl: 3    polyethylene glycol (MIRALAX) 17 gram packet, Take 1 Packet by mouth daily. Indications: constipation, Disp: 1 Each, Rfl: 0    aspirin 81 mg chewable tablet, Take 81 mg by mouth daily. , Disp: , Rfl:    Date Last Reviewed:  6/7/2019     Number of Personal Factors/Comorbidities that affect the Plan of Care: 0: LOW COMPLEXITY   EXAMINATION:   Observation/Orthostatic Postural Assessment:          Patient shows increased forward head and rounded shoulders posture. Cervical spine appears flattened as well as thoracic spine. Increased tone noted along cervical spine. Patient shows elevated clavicle on the right side with right AC joint a little more prominent. Patient has increased thoracic cage left rotation and pelvic and lumbar right rotation. Palpation:          Upper cervical- shows increased soft tissue and fascia restrictions with decreased mobility throughout sib-occipitals as well as paraspinals  -SCM and anterior cervical spine also restricted due to posture and position.  -Decreased lateral side glide through lower cervical spine from left to right more  -most of cervical rotation comes from upper c-spine with little to no motion occurring going left in lower cervical spine and CT junction  -Limited first rib mobility on right and left side.  -Increased facet joint stiffness throughout lower cervical spine for extension testing  -Flexion along thoracic spine limited as well as extension with decreased curvature  -Limitation in palm down abduction through [facility] joint on right side  ROM:          Cervical ROM: Rotation R-60%, L-40%  Side bending: R 6 fingers with pain on left side Left at 5 fingers without not as much pain  Flexion to 3 fingers from sternum and extension mostly at upper cervical spine  Strength:          5/5 for B UE except for right shoulder ER at 4/5  Special Tests:          Cervical stability/vertebral artery tests:   1. Sharper Pursor: (-)  2. Alar ligament: (-)  3. Shear test: (-)  4. Tectorial membrane distraction:(-)  5. Transverse ligament lift up test: (-)      Neurological Screen:        Myotomes:  Limited in C5-6        Neural Tension Tests:  (+) for cervical tension   Body Structures Involved:  1. Nerves  2. Thoracic Cage  3. Bones  4. Joints  5. Muscles  6. Ligaments Body Functions Affected:  1. Neuromusculoskeletal  2.  Movement Related Activities and Participation Affected:  1. General Tasks and Demands  2. Mobility  3. Domestic Life  4.  Interpersonal Interactions and Relationships   Number of elements (examined above) that affect the Plan of Care: 4+: HIGH COMPLEXITY   CLINICAL PRESENTATION:   Presentation: Stable and uncomplicated: LOW COMPLEXITY   CLINICAL DECISION MAKING:   Use of outcome tool(s) and clinical judgement create a POC that gives a: Clear prediction of patient's progress: LOW COMPLEXITY

## 2019-06-07 NOTE — PROGRESS NOTES
Tariq Horner  : 1950  Primary: [facility]  [place] Therapy Center at [facility]  [address] [place] [address]  [phone]   [phone]        OUTPATIENT PHYSICAL THERAPY: Daily Treatment Note 2019  Visit Count:  1    ICD-10: Treatment Diagnosis: Cervicalgia, M54.2  Precautions/Allergies:   Celebrex [celecoxib] and Sulfa (sulfonamide antibiotics)   TREATMENT PLAN:  Effective Dates: 2019 TO 2019 (90 days). Frequency/Duration: 2 times a week for for 4 weeks then one time a week for 8 weeks    Pre-treatment Symptoms/Complaints:  Patient reports increased pain with movement looking left  Pain: Initial: Pain Intensity 1: 1 none right now/10 Post Session:  0/10   Medications Last Reviewed:  2019  Updated Objective Findings:  See evaluation note from today  TREATMENT:     Evaluation and education today    [facility]  Treatment/Session Summary:    · Response to Treatment:  Patient understands HEP and POC at this time. · Communication/Consultation:  None today  · Equipment provided today:  None today  · Recommendations/Intent for next treatment session: Next visit will focus on ROM and soft tissue/joint mobility  · .     Total Treatment Billable Duration:  0 minutes  PT Patient Time In/Time Out  Time In: 1400  Time Out: Liya 65, PT    Future Appointments   Date Time Provider Serg Kitchen   2019 11:00 AM Leticia Duarte, PT [facility]   2019  2:00 PM Leticia Duarte, PT [facility]   2019 10:00 AM Davida Velazquez, PT [facility] [place]   2019  9:00 AM Leticia Duarte, PT [facility] [place]   2019  9:00 AM Davida Strickland, PT [facility] [place]   2019  2:00 PM Leticia Duarte, PT [facility]   2019  2:00 PM Kalpesh Martini MD [facility]   7/3/2019  9:00 AM Davida Velazquez, PT [facility]

## 2019-06-11 ENCOUNTER — HOSPITAL ENCOUNTER (OUTPATIENT)
Dept: PHYSICAL THERAPY | Age: 69
Discharge: HOME | End: 2019-06-11
Payer: COMMERCIAL

## 2019-06-11 PROCEDURE — 97110 THERAPEUTIC EXERCISES: CPT

## 2019-06-11 PROCEDURE — 97140 MANUAL THERAPY 1/> REGIONS: CPT

## 2019-06-11 NOTE — PROGRESS NOTES
Victoria Hargrove  : 1950  Primary: [employer]  [place] 924 [facility]  [address] [place] [address]  [phone]   [phone]        OUTPATIENT PHYSICAL THERAPY: Daily Treatment Note 2019  Visit Count:  2    ICD-10: Treatment Diagnosis: Cervicalgia, M54.2  Precautions/Allergies:   Celebrex [celecoxib] and Sulfa (sulfonamide antibiotics)   TREATMENT PLAN:  Effective Dates: 2019 TO 2019 (90 days). Frequency/Duration: 2 times a week for for 4 weeks then one time a week for 8 weeks    Pre-treatment Symptoms/Complaints:  Patient reports increased pain with movement looking left  Pain: Initial: Pain Intensity 1: 2 none right now/10 Post Session:  0/10   Medications Last Reviewed:  2019  Updated Objective Findings:  See evaluation note from today  TREATMENT:     THERAPEUTIC EXERCISE: (10 minutes):  Exercises per grid below to improve mobility, strength and coordination. Required minimal visual, verbal, manual and tactile cues to promote proper body alignment, promote proper body posture, promote proper body mechanics and promote proper body breathing techniques. Progressed resistance, range, repetitions and complexity of movement as indicated. Date:  2019     Activity/Exercise Parameters   Axial elongation Supine 5 x 15 sec hold with traction applied manually   Sitting thoracic spine  mobility Flexion and extension through spine x 5 min for education and training.   Stopped hands and knees due to knee pain                           MANUAL THERAPY: (45 minutes): Joint mobilization and Soft tissue mobilization was utilized and necessary because of the patient's restricted joint motion, loss of articular motion and restricted motion of soft tissue.   -Supine soft tissue mobilization to superficial fascia of posterior and lateral cervical spine, groove of spine, sub-occipitals, anterior cervical spine, upper trap   -traction applied to relieve symptoms and tightness throughout treatment   -Cervical side glide right to left to improve overall cervical mobility. [facility] Portal  Treatment/Session Summary:    · Response to Treatment:  Patient shows slightly improved left rotation without pain increase  · Communication/Consultation:  None today  · Equipment provided today:  None today  · Recommendations/Intent for next treatment session: Next visit will focus on ROM and soft tissue/joint mobility  · .     Total Treatment Billable Duration:  55 minutes  PT Patient Time In/Time Out  Time In: 1103  Time Out: [facility] [address] PT    Future Appointments   Date Time Provider Serg Kitchen   6/17/2019  2:00 PM Felicia Aly, PT [facility]   6/20/2019 10:00 AM Renu Velazquez, PT [facility]   6/26/2019  9:00 AM Felicia Aly, PT [facility]   6/28/2019  9:00 AM Renu Clay, PT [facility]   7/1/2019  2:00 PM Felicia Aly, PT [facility]   7/2/2019  2:00 PM Boby Mchugh MD [facility]   7/3/2019  9:00 AM Renu Velazquez, PT [facility]

## 2019-06-17 ENCOUNTER — HOSPITAL ENCOUNTER (OUTPATIENT)
Dept: PHYSICAL THERAPY | Age: 69
Discharge: HOME | End: 2019-06-17
Payer: COMMERCIAL

## 2019-06-17 PROCEDURE — 97140 MANUAL THERAPY 1/> REGIONS: CPT

## 2019-06-17 NOTE — PROGRESS NOTES
Marly Navarro  : 1950  Primary: [facility]  [place] [facility]  [address] [place] [address]  [phone]   [phone]        OUTPATIENT PHYSICAL THERAPY: Daily Treatment Note 2019  Visit Count:  3    ICD-10: Treatment Diagnosis: Cervicalgia, M54.2  Precautions/Allergies:   Celebrex [celecoxib] and Sulfa (sulfonamide antibiotics)   TREATMENT PLAN:  Effective Dates: 2019 TO 2019 (90 days). Frequency/Duration: 2 times a week for for 4 weeks then one time a week for 8 weeks    Pre-treatment Symptoms/Complaints:  Patient reports increased pain  In the right shoulder and shoulder blade area  Pain: Initial: Pain Intensity 1: 1 none right now/10 Post Session:  0/10   Medications Last Reviewed:  2019  Updated Objective Findings:  See evaluation note from today  TREATMENT:     THERAPEUTIC EXERCISE: (reviewed minutes):  Exercises per grid below to improve mobility, strength and coordination. Required minimal visual, verbal, manual and tactile cues to promote proper body alignment, promote proper body posture, promote proper body mechanics and promote proper body breathing techniques. Progressed resistance, range, repetitions and complexity of movement as indicated. Date:  2019     Activity/Exercise Parameters   Axial elongation Supine 5 x 15 sec hold with traction applied manually   Sitting thoracic spine  mobility Flexion and extension through spine x 5 min for education and training.   Stopped hands and knees due to knee pain                           MANUAL THERAPY: (45 minutes): Joint mobilization and Soft tissue mobilization was utilized and necessary because of the patient's restricted joint motion, loss of articular motion and restricted motion of soft tissue.   -Supine soft tissue mobilization to superficial fascia of posterior and lateral cervical spine, groove of spine, sub-occipitals, anterior cervical spine, upper trap   -traction applied to relieve symptoms and tightness throughout treatment   -Cervical side glide right to left to improve overall cervical mobility.  -Side lie scapula mobility into posterior depression  -Worked through increased tone and tenderness at mid scapula along Thoracic spine  Mechanical Traction trial: 10 min at 15 pounds pressure  MedBridge Portal  Treatment/Session Summary:    · Response to Treatment:  Patient reports good feeling with traction today. He reports his shoulder and scapula feel better now  · Communication/Consultation:  None today  · Equipment provided today:  None today  · Recommendations/Intent for next treatment session: Next visit will focus on ROM and soft tissue/joint mobility  · .     Total Treatment Billable Duration:  55 minutes  PT Patient Time In/Time Out  Time In: 1400  Time Out: Willian 12, PT    Future Appointments   Date Time Provider Serg Kitchen   6/20/2019 10:00 AM Flores Chacon, PT [facility]   6/24/2019  1:00 PM Flores Chacon PT [facility]   6/28/2019  9:00 AM Jarad Velazquez, PT [facility]   7/1/2019  2:00 PM Flores Chacon PT [facility]   7/2/2019  2:00 PM MD HERIBERTO Darden   7/3/2019  9:00 AM Jarad Velazquez, PT [facility]

## 2019-06-20 ENCOUNTER — HOSPITAL ENCOUNTER (OUTPATIENT)
Dept: PHYSICAL THERAPY | Age: 69
Discharge: HOME | End: 2019-06-20
Payer: COMMERCIAL

## 2019-06-20 PROCEDURE — 97140 MANUAL THERAPY 1/> REGIONS: CPT

## 2019-06-20 PROCEDURE — 97012 MECHANICAL TRACTION THERAPY: CPT

## 2019-06-20 NOTE — PROGRESS NOTES
Livia Ramey  : 1950  Primary: [facility]  [place] [facility]  [address] [place] [address]  [phone]   [phone]        OUTPATIENT PHYSICAL THERAPY: Daily Treatment Note 2019  Visit Count:  4    ICD-10: Treatment Diagnosis: Cervicalgia, M54.2  Precautions/Allergies:   Celebrex [celecoxib] and Sulfa (sulfonamide antibiotics)   TREATMENT PLAN:  Effective Dates: 2019 TO 2019 (90 days). Frequency/Duration: 2 times a week for for 4 weeks then one time a week for 8 weeks    Pre-treatment Symptoms/Complaints:  Patient reports he had no pain in the shoulder blade area for two days  Pain: Initial: Pain Intensity 1: 1 none right now/10 Post Session:  0/10   Medications Last Reviewed:  2019  Updated Objective Findings:  See evaluation note from today  TREATMENT:     THERAPEUTIC EXERCISE: (reviewed minutes):  Exercises per grid below to improve mobility, strength and coordination. Required minimal visual, verbal, manual and tactile cues to promote proper body alignment, promote proper body posture, promote proper body mechanics and promote proper body breathing techniques. Progressed resistance, range, repetitions and complexity of movement as indicated. Date:  2019     Activity/Exercise Parameters   Axial elongation Supine 5 x 15 sec hold with traction applied manually   Sitting thoracic spine  mobility Flexion and extension through spine x 5 min for education and training.   Stopped hands and knees due to knee pain                           MANUAL THERAPY: (45 minutes): Joint mobilization and Soft tissue mobilization was utilized and necessary because of the patient's restricted joint motion, loss of articular motion and restricted motion of soft tissue.   -Supine soft tissue mobilization to superficial fascia of posterior and lateral cervical spine, groove of spine, sub-occipitals, anterior cervical spine, upper trap   -traction applied to relieve symptoms and tightness throughout treatment   -Cervical side glide right to left to improve overall cervical mobility.  -Side lie scapula mobility into posterior depression  -Worked through increased tone and tenderness at mid scapula along Thoracic spine    Mechanical Traction trial: 10 min at 15 pounds pressure with good results    Med[facility] Portal  Treatment/Session Summary:    · Response to Treatment:  Patient reports good feeling with traction today. Continue with upper cervical mobility  · Communication/Consultation:  None today  · Equipment provided today:  None today  · Recommendations/Intent for next treatment session: Next visit will focus on ROM and soft tissue/joint mobility  · .     Total Treatment Billable Duration:  55 minutes  PT Patient Time In/Time Out  Time In: 1000  Time Out: [facility], PT    Future Appointments   Date Time Provider Serg Kitchen   6/24/2019  1:00 PM Spenser Laureano, PT [facility]   6/28/2019  9:00 AM Nurys Velazquez, PT [facility]   7/1/2019  2:00 PM Spenser Laureano PT [facility]   7/2/2019  2:00 PM Yaya Duenas MD [facility]   7/3/2019  9:00 AM Nurys Velazquez, PT [facility]

## 2019-06-24 ENCOUNTER — HOSPITAL ENCOUNTER (OUTPATIENT)
Dept: PHYSICAL THERAPY | Age: 69
Discharge: HOME | End: 2019-06-24
Payer: COMMERCIAL

## 2019-06-24 PROCEDURE — 97012 MECHANICAL TRACTION THERAPY: CPT

## 2019-06-24 PROCEDURE — 97140 MANUAL THERAPY 1/> REGIONS: CPT

## 2019-06-28 ENCOUNTER — HOSPITAL ENCOUNTER (OUTPATIENT)
Dept: PHYSICAL THERAPY | Age: 69
Discharge: HOME | End: 2019-06-28
Payer: COMMERCIAL

## 2019-06-28 PROCEDURE — 97012 MECHANICAL TRACTION THERAPY: CPT

## 2019-06-28 PROCEDURE — 97140 MANUAL THERAPY 1/> REGIONS: CPT

## 2019-06-28 NOTE — PROGRESS NOTES
An Ferguson  : 1950  Primary: Yonatan Treviño  [place] [facility] at [facility]  [address] [place] [address]  [phone]   [phone]        OUTPATIENT PHYSICAL THERAPY: Daily Treatment Note 2019  Visit Count:  6    ICD-10: Treatment Diagnosis: Cervicalgia, M54.2  Precautions/Allergies:   Celebrex [celecoxib] and Sulfa (sulfonamide antibiotics)   TREATMENT PLAN:  Effective Dates: 2019 TO 2019 (90 days). Frequency/Duration: 2 times a week for for 4 weeks then one time a week for 8 weeks    Pre-treatment Symptoms/Complaints:  Patient reports he had an injection in the knee that has helped a lot. He still has some of the upper trap tightness  Pain: Initial: Pain Intensity 1: 1 /10 Post Session:  0/10   Medications Last Reviewed:  2019  Updated Objective Findings:  See evaluation note from today  TREATMENT:     THERAPEUTIC EXERCISE: (reviewed minutes):  Exercises per grid below to improve mobility, strength and coordination. Required minimal visual, verbal, manual and tactile cues to promote proper body alignment, promote proper body posture, promote proper body mechanics and promote proper body breathing techniques. Progressed resistance, range, repetitions and complexity of movement as indicated. Date:  2019     Activity/Exercise Parameters   Axial elongation Supine 5 x 15 sec hold with traction applied manually   Sitting thoracic spine  mobility Flexion and extension through spine x 5 min for education and training.   Stopped hands and knees due to knee pain                           MANUAL THERAPY: (45 minutes): Joint mobilization and Soft tissue mobilization was utilized and necessary because of the patient's restricted joint motion, loss of articular motion and restricted motion of soft tissue.   -Supine soft tissue mobilization to superficial fascia of posterior and lateral cervical spine, groove of spine, sub-occipitals, anterior cervical spine, upper trap   -traction applied to relieve symptoms and tightness throughout treatment   -Cervical side glide right to left to improve overall cervical mobility.  -Side lie scapula mobility into posterior depression  -Worked through increased tone and tenderness at mid scapula along Thoracic spine    Mechanical Traction trial: 10 min at 15 pounds pressure with good results    MedBridge Portal  Treatment/Session Summary:    · Response to Treatment:  Patient reports good feeling with traction today. His knee feels better, but is getting an MRI next week. · Communication/Consultation:  None today  · Equipment provided today:  None today  · Recommendations/Intent for next treatment session: Next visit will focus on ROM and soft tissue/joint mobility  · .     Total Treatment Billable Duration:  55 minutes  PT Patient Time In/Time Out  Time In: 0900  Time Out: Norma Smiley, PT    Future Appointments   Date Time Provider Serg Kitchen   7/1/2019  2:00 PM Heide Guerra, YVETTE [facility]   7/2/2019  2:00 PM Priscila Reyes MD [facility]   7/3/2019  9:00 AM Alissa Velazquez, PT [facility]

## 2019-07-01 ENCOUNTER — HOSPITAL ENCOUNTER (OUTPATIENT)
Dept: PHYSICAL THERAPY | Age: 69
Discharge: HOME | End: 2019-07-01
Payer: MEDICARE

## 2019-07-01 PROCEDURE — 97140 MANUAL THERAPY 1/> REGIONS: CPT

## 2019-07-01 PROCEDURE — 97012 MECHANICAL TRACTION THERAPY: CPT

## 2019-07-01 NOTE — PROGRESS NOTES
Vanessa Machado  : 1950  [place]  Secondary: [facility] [place] [address] at [facility]  [address] [place] [address]  [phone]   [phone]        OUTPATIENT PHYSICAL THERAPY: Daily Treatment Note 2019  Visit Count:  7    ICD-10: Treatment Diagnosis: Cervicalgia, M54.2  Precautions/Allergies:   Celebrex [celecoxib] and Sulfa (sulfonamide antibiotics)   TREATMENT PLAN:  Effective Dates: 2019 TO 2019 (90 days). Frequency/Duration: 2 times a week for for 4 weeks then one time a week for 8 weeks    Pre-treatment Symptoms/Complaints:  Patient reports the knee is doing much better and I was able to play on it some. He reports that the shoulder still hurts a little, but much less now  Pain: Initial: Pain Intensity 1: 1 /10 Post Session:  0/10   Medications Last Reviewed:  2019  Updated Objective Findings:  See evaluation note from today  TREATMENT:     THERAPEUTIC EXERCISE: (reviewed minutes):  Exercises per grid below to improve mobility, strength and coordination. Required minimal visual, verbal, manual and tactile cues to promote proper body alignment, promote proper body posture, promote proper body mechanics and promote proper body breathing techniques. Progressed resistance, range, repetitions and complexity of movement as indicated. Date:  2019     Activity/Exercise Parameters   Axial elongation Supine 5 x 15 sec hold with traction applied manually   Sitting thoracic spine  mobility Flexion and extension through spine x 5 min for education and training.   Stopped hands and knees due to knee pain                           MANUAL THERAPY: (45 minutes): Joint mobilization and Soft tissue mobilization was utilized and necessary because of the patient's restricted joint motion, loss of articular motion and restricted motion of soft tissue.   -Supine soft tissue mobilization to superficial fascia of posterior and lateral cervical spine, groove of spine, sub-occipitals, anterior cervical spine, upper trap   -traction applied to relieve symptoms and tightness throughout treatment   -Cervical side glide right to left to improve overall cervical mobility.  -Side lie scapula mobility into posterior depression  -Worked through increased tone and tenderness at mid scapula along Thoracic spine    Mechanical Traction trial: 10 min at 15 pounds pressure with good results    MedBridge Portal  Treatment/Session Summary:    · Response to Treatment:  Patient reports good feeling with traction today. We worked on rhomboids and trap mobilization for restrictions. · Communication/Consultation:  None today  · Equipment provided today:  None today  · Recommendations/Intent for next treatment session: Next visit will focus on ROM and soft tissue/joint mobility  · .     Total Treatment Billable Duration:  55 minutes  PT Patient Time In/Time Out  Time In: 1400  Time Out: [address] PT    Future Appointments   Date Time Provider Serg Kitchen   7/2/2019  2:00 PM Leticia Goldmann, MD [facility]   7/3/2019  9:00 AM Renny Velazquez, PT [facility]   7/24/2019  9:00 AM Renny Velazquez, PT [facility]   7/26/2019 10:00 AM Renny Velazquez, PT [facility]   7/29/2019  2:00 PM Fabricio Ordoñez, YVETTE [facility]   7/31/2019  9:00 AM Renny Velazquez, PT [facility]

## 2019-07-03 ENCOUNTER — HOSPITAL ENCOUNTER (OUTPATIENT)
Dept: PHYSICAL THERAPY | Age: 69
Discharge: HOME | End: 2019-07-03
Payer: MEDICARE

## 2019-07-03 PROCEDURE — 97140 MANUAL THERAPY 1/> REGIONS: CPT

## 2019-07-03 NOTE — PROGRESS NOTES
Rahul Crawley  : 1950  [place]  Secondary: [facility] [place] [facility]  [address] [place] [address]  [phone]   [phone]        OUTPATIENT PHYSICAL THERAPY: Daily Treatment Note 7/3/2019  Visit Count:  8    ICD-10: Treatment Diagnosis: Cervicalgia, M54.2  Precautions/Allergies:   Celebrex [celecoxib] and Sulfa (sulfonamide antibiotics)   TREATMENT PLAN:  Effective Dates: 2019 TO 2019 (90 days). Frequency/Duration: 2 times a week for for 4 weeks then one time a week for 8 weeks    Pre-treatment Symptoms/Complaints:  Patient reports the neck and shoulder area felt really good for the day after last visit. He also has noticed that he is able to put drops in his eyes easier now. Pain: Initial: Pain Intensity 1: 1 /10 Post Session:  0/10   Medications Last Reviewed:  7/3/2019  Updated Objective Findings:  See evaluation note from today  TREATMENT:     THERAPEUTIC EXERCISE: (reviewed minutes):  Exercises per grid below to improve mobility, strength and coordination. Required minimal visual, verbal, manual and tactile cues to promote proper body alignment, promote proper body posture, promote proper body mechanics and promote proper body breathing techniques. Progressed resistance, range, repetitions and complexity of movement as indicated. Date:  7/3/2019     Activity/Exercise Parameters   Axial elongation Supine 5 x 15 sec hold with traction applied manually   Sitting thoracic spine  mobility Flexion and extension through spine x 5 min for education and training.   Stopped hands and knees due to knee pain                           MANUAL THERAPY: (55 minutes): Joint mobilization and Soft tissue mobilization was utilized and necessary because of the patient's restricted joint motion, loss of articular motion and restricted motion of soft tissue.   -Supine soft tissue mobilization to superficial fascia of posterior and lateral cervical spine, groove of spine, sub-occipitals, anterior cervical spine, upper trap   -traction applied to relieve symptoms and tightness throughout treatment   -Cervical side glide right to left to improve overall cervical mobility.  -Side lie scapula mobility into posterior depression  -Worked through increased tone and tenderness at mid scapula along Thoracic spine    Mechanical Traction trial: 10 min at 15 pounds pressure with good results (not charged for    [employer] Portal  Treatment/Session Summary:    · Response to Treatment:  Patient reports good feeling with traction today. Improvement in thoracic spine mobility. Continue to work lower cervical spine mobility  · Communication/Consultation:  None today  · Equipment provided today:  None today  · Recommendations/Intent for next treatment session: Next visit will focus on ROM and soft tissue/joint mobility  · .     Total Treatment Billable Duration:  65 minutes  PT Patient Time In/Time Out  Time In: 0900  Time Out: [address], PT    Future Appointments   Date Time Provider Serg Kitchen   7/8/2019  4:29 PM SFO CT [address] [facility]   7/10/2019 11:00 AM Shea Velazquez, PT [facility]   7/24/2019  9:00 AM Shea Velazquez, PT [facility]   7/26/2019 10:00 AM Shea Velazquez, PT [facility]   7/29/2019  2:00 PM Erica Light, PT [facility]   7/31/2019  9:00 AM Shea Velazquez, PT [facility]

## 2019-07-08 ENCOUNTER — HOSPITAL ENCOUNTER (OUTPATIENT)
Dept: CT IMAGING | Age: 69
Discharge: HOME | End: 2019-07-08
Attending: INTERNAL MEDICINE

## 2019-07-08 DIAGNOSIS — K59.00 CONSTIPATION, UNSPECIFIED CONSTIPATION TYPE: ICD-10-CM

## 2019-07-08 DIAGNOSIS — R63.4 WEIGHT LOSS: ICD-10-CM

## 2019-07-08 RX ORDER — SODIUM CHLORIDE 0.9 % (FLUSH) 0.9 %
10 SYRINGE (ML) INJECTION
Status: COMPLETED | OUTPATIENT
Start: 2019-07-08 | End: 2019-07-08

## 2019-07-08 RX ADMIN — Medication 10 ML: at 15:07

## 2019-07-09 NOTE — PROGRESS NOTES
Patient notified of CT results. Patient verbalized understanding. Patient wanted to know if follow up appt was needed to discuss lab work?

## 2019-07-10 ENCOUNTER — HOSPITAL ENCOUNTER (OUTPATIENT)
Dept: PHYSICAL THERAPY | Age: 69
Discharge: HOME | End: 2019-07-10
Payer: MEDICARE

## 2019-07-10 PROCEDURE — 97140 MANUAL THERAPY 1/> REGIONS: CPT

## 2019-07-10 NOTE — PROGRESS NOTES
Greg Valles  : 1950  [place]  Secondary: [facility] [place] [facility]  [address] [place] [address]  [phone]   [phone]        OUTPATIENT PHYSICAL THERAPY: Daily Treatment Note 7/10/2019  Visit Count:  9    ICD-10: Treatment Diagnosis: Cervicalgia, M54.2  Precautions/Allergies:   Celebrex [celecoxib] and Sulfa (sulfonamide antibiotics)   TREATMENT PLAN:  Effective Dates: 2019 TO 2019 (90 days). Frequency/Duration: 2 times a week for for 4 weeks then one time a week for 8 weeks    Pre-treatment Symptoms/Complaints:  Patient reports that everything is still doing pretty well. I had the MRI and CT scan and all came back ok with no major issues. Pain: Initial: Pain Intensity 1: 1 /10 Post Session:  0/10   Medications Last Reviewed:  7/10/2019  Updated Objective Findings:  See evaluation note from today  TREATMENT:     THERAPEUTIC EXERCISE: (5 minutes):  Exercises per grid below to improve mobility, strength and coordination. Required minimal visual, verbal, manual and tactile cues to promote proper body alignment, promote proper body posture, promote proper body mechanics and promote proper body breathing techniques. Progressed resistance, range, repetitions and complexity of movement as indicated. Date:  7/10/2019     Activity/Exercise Parameters   Axial elongation Supine 5 x 15 sec hold with traction applied manually   Sitting thoracic spine  mobility Flexion and extension through spine x 5 min for education and training.   Stopped hands and knees due to knee pain   Upper trap stretch Sitting 3 x 30 sec                       MANUAL THERAPY: (45 minutes): Joint mobilization and Soft tissue mobilization was utilized and necessary because of the patient's restricted joint motion, loss of articular motion and restricted motion of soft tissue.   -Supine soft tissue mobilization to superficial fascia of posterior and lateral cervical spine, groove of spine, sub-occipitals, anterior cervical spine, upper trap   -traction applied to relieve symptoms and tightness throughout treatment   -Cervical side glide right to left to improve overall cervical mobility.  -Side lie scapula mobility into posterior depression  -Worked through increased tone and tenderness at mid scapula along Thoracic spine  -Supine sternum mobilization to ribs 2 and 3 sterno-costal junction. Right side more raised at manubrium  Mechanical Traction trial: 10 min at 15 pounds pressure with good results (not charged for    [employer] Portal  Treatment/Session Summary:    · Response to Treatment:  Patient shows improvement in thoracic spine mobility. Started some sternal work today to help the right side  · Communication/Consultation:  None today  · Equipment provided today:  None today  · Recommendations/Intent for next treatment session: Next visit will focus on ROM and soft tissue/joint mobility  · .     Total Treatment Billable Duration:  65 minutes  PT Patient Time In/Time Out  Time In: 1102  Time Out: [address] PT    Future Appointments   Date Time Provider Serg Kitchen   7/24/2019  9:00 AM Kate Torres, PT [facility]   7/26/2019 10:00 AM Devorah Velazquez, PT [facility]   7/29/2019  2:00 PM Kate Torres, PT [facility]   7/31/2019  9:00 AM Devorah Velazquez, PT [facility]

## 2019-07-24 ENCOUNTER — HOSPITAL ENCOUNTER (OUTPATIENT)
Dept: PHYSICAL THERAPY | Age: 69
Discharge: HOME | End: 2019-07-24
Payer: MEDICARE

## 2019-07-24 PROCEDURE — 97140 MANUAL THERAPY 1/> REGIONS: CPT

## 2019-07-24 NOTE — PROGRESS NOTES
Vaughn Hays  : 1950  [place]  Secondary: [facility]  [address] [place] [address]  [phone]   [phone]        OUTPATIENT PHYSICAL THERAPY: Daily Treatment Note 2019  Visit Count:  10    ICD-10: Treatment Diagnosis: Cervicalgia, M54.2  Precautions/Allergies:   Celebrex [celecoxib] and Sulfa (sulfonamide antibiotics)   TREATMENT PLAN:  Effective Dates: 2019 TO 2019 (90 days). Frequency/Duration: 2 times a week for for 4 weeks then one time a week for 8 weeks    Pre-treatment Symptoms/Complaints:  Patient reports that everything is still doing pretty well. The knee is a little flared up after the vacation, but he is going to the doctor later today  Pain: Initial: Pain Intensity 1: 2 /10 Post Session:  0/10   Medications Last Reviewed:  2019  Updated Objective Findings:  See evaluation note from today  TREATMENT:     THERAPEUTIC EXERCISE: (5 minutes):  Exercises per grid below to improve mobility, strength and coordination. Required minimal visual, verbal, manual and tactile cues to promote proper body alignment, promote proper body posture, promote proper body mechanics and promote proper body breathing techniques. Progressed resistance, range, repetitions and complexity of movement as indicated. Date:  2019     Activity/Exercise Parameters   Axial elongation Supine 5 x 15 sec hold with traction applied manually   Sitting thoracic spine  mobility Flexion and extension through spine x 5 min for education and training.   Stopped hands and knees due to knee pain   Upper trap stretch Sitting 3 x 30 sec                       MANUAL THERAPY: (54 minutes): Joint mobilization and Soft tissue mobilization was utilized and necessary because of the patient's restricted joint motion, loss of articular motion and restricted motion of soft tissue.   -Supine soft tissue mobilization to superficial fascia of posterior and lateral cervical spine, groove of spine, sub-occipitals, anterior cervical spine, upper trap   -traction applied to relieve symptoms and tightness throughout treatment   -Cervical side glide right to left to improve overall cervical mobility.  -Side lie scapula mobility into posterior depression  -Worked through increased tone and tenderness at mid scapula along Thoracic spine  -Supine sternum mobilization to ribs 2 and 3 sterno-costal junction. Right side more raised at manubrium    Mechanical Traction trial:  Not today min at 15 pounds pressure with good results (not charged for    [employer] Portal  Treatment/Session Summary:    · Response to Treatment:  Patient shows improvement in thoracic spine mobility. Improvement in thoracic rotation and cervical extension  · Communication/Consultation:  None today  · Equipment provided today:  None today  · Recommendations/Intent for next treatment session: Next visit will focus on ROM and soft tissue/joint mobility  · .     Total Treatment Billable Duration:  54 minutes  PT Patient Time In/Time Out  Time In: 0901  Time Out: [facility], PT    Future Appointments   Date Time Provider Serg Kitchen   7/26/2019 10:00 AM Adelina Pfeiffer PT [facility]   7/29/2019  2:00 PM Adelina Pfeiffer PT [facility]   7/31/2019  9:00 AM Deja Velazquez, PT [facility]

## 2019-07-26 ENCOUNTER — HOSPITAL ENCOUNTER (OUTPATIENT)
Dept: PHYSICAL THERAPY | Age: 69
Discharge: HOME | End: 2019-07-26
Payer: MEDICARE

## 2019-07-26 PROCEDURE — 97140 MANUAL THERAPY 1/> REGIONS: CPT

## 2019-07-26 NOTE — PROGRESS NOTES
Patel Couch  : 1950  [place]  [place] [address] [place] at [facility]  [address] [place] [address]  [phone]   [phone]        OUTPATIENT PHYSICAL THERAPY: Daily Treatment Note 2019  Visit Count:  11    ICD-10: Treatment Diagnosis: Cervicalgia, M54.2  Precautions/Allergies:   Celebrex [celecoxib] and Sulfa (sulfonamide antibiotics)   TREATMENT PLAN:  Effective Dates: 2019 TO 2019 (90 days). Frequency/Duration: 2 times a week for for 4 weeks then one time a week for 8 weeks    Pre-treatment Symptoms/Complaints:  Patient reports that everything is still doing pretty well. He had an injections into the knee for the next three weeks  Pain: Initial: Pain Intensity 1: 2 /10 Post Session:  0/10   Medications Last Reviewed:  2019  Updated Objective Findings:  See evaluation note from today  TREATMENT:     THERAPEUTIC EXERCISE: (0 minutes):  Exercises per grid below to improve mobility, strength and coordination. Required minimal visual, verbal, manual and tactile cues to promote proper body alignment, promote proper body posture, promote proper body mechanics and promote proper body breathing techniques. Progressed resistance, range, repetitions and complexity of movement as indicated. Date:  2019     Activity/Exercise Parameters   Axial elongation Supine 5 x 15 sec hold with traction applied manually   Sitting thoracic spine  mobility Flexion and extension through spine x 5 min for education and training.   Stopped hands and knees due to knee pain   Upper trap stretch Sitting 3 x 30 sec                       MANUAL THERAPY: (54 minutes): Joint mobilization and Soft tissue mobilization was utilized and necessary because of the patient's restricted joint motion, loss of articular motion and restricted motion of soft tissue.   -Supine soft tissue mobilization to superficial fascia of posterior and lateral cervical spine, groove of spine, sub-occipitals, anterior cervical spine, upper trap   -traction applied to relieve symptoms and tightness throughout treatment   -Cervical side glide right to left to improve overall cervical mobility.  -Side lie scapula mobility into posterior depression  -Worked through increased tone and tenderness at mid scapula along Thoracic spine  -Supine sternum mobilization to ribs 2 and 3 sterno-costal junction. Right side more raised at manubrium    Mechanical Traction trial:  Not today min at 15 pounds pressure with good results (not charged for    [employer] Portal  Treatment/Session Summary:    · Response to Treatment:  Patient shows improvement in thoracic spine mobility. Continue to maintain cervical mobility  · Communication/Consultation:  None today  · Equipment provided today:  None today  · Recommendations/Intent for next treatment session: Next visit will focus on ROM and soft tissue/joint mobility  · .     Total Treatment Billable Duration:  54 minutes  PT Patient Time In/Time Out  Time In: 1000  Time Out: [address], PT    Future Appointments   Date Time Provider Serg Kitchen   7/29/2019  2:00 PM Remington Cota PT [facility]   7/31/2019  9:00 AM Juaquin Velazquez, YVETTE [facility]

## 2019-07-29 ENCOUNTER — HOSPITAL ENCOUNTER (OUTPATIENT)
Dept: PHYSICAL THERAPY | Age: 69
Discharge: HOME | End: 2019-07-29
Payer: MEDICARE

## 2019-07-29 PROCEDURE — 97140 MANUAL THERAPY 1/> REGIONS: CPT

## 2019-07-29 NOTE — PROGRESS NOTES
Aileen Paul  : 1950  [place]  [place] [address] at [facility]  [address] [place] [address]  [phone]   [phone]        OUTPATIENT PHYSICAL THERAPY: Daily Treatment Note 2019  Visit Count:  12    ICD-10: Treatment Diagnosis: Cervicalgia, M54.2  Precautions/Allergies:   Celebrex [celecoxib] and Sulfa (sulfonamide antibiotics)   TREATMENT PLAN:  Effective Dates: 2019 TO 2019 (90 days). Frequency/Duration: 2 times a week for for 4 weeks then one time a week for 8 weeks    Pre-treatment Symptoms/Complaints:  Patient reports that everything is still doing pretty well. He has another knee injection tomorrow  Pain: Initial: Pain Intensity 1: 2 /10 Post Session:  0/10   Medications Last Reviewed:  2019  Updated Objective Findings:  See evaluation note from today  TREATMENT:     THERAPEUTIC EXERCISE: (0 minutes):  Exercises per grid below to improve mobility, strength and coordination. Required minimal visual, verbal, manual and tactile cues to promote proper body alignment, promote proper body posture, promote proper body mechanics and promote proper body breathing techniques. Progressed resistance, range, repetitions and complexity of movement as indicated. Date:  2019     Activity/Exercise Parameters   Axial elongation Supine 5 x 15 sec hold with traction applied manually   Sitting thoracic spine  mobility Flexion and extension through spine x 5 min for education and training.   Stopped hands and knees due to knee pain   Upper trap stretch Sitting 3 x 30 sec                       MANUAL THERAPY: (55 minutes): Joint mobilization and Soft tissue mobilization was utilized and necessary because of the patient's restricted joint motion, loss of articular motion and restricted motion of soft tissue.   -Supine soft tissue mobilization to superficial fascia of posterior and lateral cervical spine, groove of spine, sub-occipitals, anterior cervical spine, upper trap   -traction applied to relieve symptoms and tightness throughout treatment   -Cervical side glide right to left to improve overall cervical mobility.  -Side lie scapula mobility into posterior depression  -Side lie latissimus mobilization  -Worked through increased tone and tenderness at mid scapula along Thoracic spine  -Supine sternum mobilization to ribs 2 and 3 sterno-costal junction. Right side more raised at manubrium    Mechanical Traction trial:  Not today min at 15 pounds pressure with good results (not charged for    [email or web address] Portal  Treatment/Session Summary:    · Response to Treatment:  Patient shows improvement in thoracic spine mobility. Continue to maintain cervical mobility  · Communication/Consultation:  None today  · Equipment provided today:  None today  · Recommendations/Intent for next treatment session: Next visit will focus on ROM and soft tissue/joint mobility  · .     Total Treatment Billable Duration:  55 minutes  PT Patient Time In/Time Out  Time In: 1400  Time Out: [address], PT    Future Appointments   Date Time Provider Serg Kitchen   7/31/2019  9:00 AM Delmar Dimas, PT [facility]

## 2019-07-31 ENCOUNTER — HOSPITAL ENCOUNTER (OUTPATIENT)
Dept: PHYSICAL THERAPY | Age: 69
Discharge: HOME | End: 2019-07-31
Payer: MEDICARE

## 2019-07-31 PROCEDURE — 97140 MANUAL THERAPY 1/> REGIONS: CPT

## 2019-07-31 PROCEDURE — 97110 THERAPEUTIC EXERCISES: CPT

## 2019-07-31 NOTE — PROGRESS NOTES
Aileen Paul  : 1950  [place]  [place]: [place] [address] [place] at [facility]  [address] [place] [address]  [phone]   [phone]        OUTPATIENT PHYSICAL THERAPY: Daily Treatment Note 2019  Visit Count:  13    ICD-10: Treatment Diagnosis: Cervicalgia, M54.2  Precautions/Allergies:   Celebrex [celecoxib] and Sulfa (sulfonamide antibiotics)   TREATMENT PLAN:  Effective Dates: 2019 TO 2019 (90 days). Frequency/Duration: 2 times a week for for 4 weeks then one time a week for 8 weeks    Pre-treatment Symptoms/Complaints:  Patient reports that everything is still doing pretty well. He still has some top of the shoulder pain, but otherwise it feels much better  Pain: Initial: Pain Intensity 1: 1 /10 Post Session:  0/10   Medications Last Reviewed:  2019  Updated Objective Findings:  See evaluation note from today  TREATMENT:     THERAPEUTIC EXERCISE: (10 minutes):  Exercises per grid below to improve mobility, strength and coordination. Required minimal visual, verbal, manual and tactile cues to promote proper body alignment, promote proper body posture, promote proper body mechanics and promote proper body breathing techniques. Progressed resistance, range, repetitions and complexity of movement as indicated. Date:  2019     Activity/Exercise Parameters   Axial elongation Supine 5 x 15 sec hold with traction applied manually   Sitting thoracic spine  mobility Flexion and extension through spine x 5 min for education and training.   Stopped hands and knees due to knee pain   Upper trap stretch Sitting 3 x 30 sec   B ER Red band x 30   Sleeper stretch 3 x 1 min with light hold for shoulder IR               MANUAL THERAPY: (45 minutes): Joint mobilization and Soft tissue mobilization was utilized and necessary because of the patient's restricted joint motion, loss of articular motion and restricted motion of soft tissue.   -Supine soft tissue mobilization to superficial fascia of posterior and lateral cervical spine, groove of spine, sub-occipitals, anterior cervical spine, upper trap   -traction applied to relieve symptoms and tightness throughout treatment   -Cervical side glide right to left to improve overall cervical mobility.  -Side lie scapula mobility into posterior depression  -Side lie latissimus mobilization  -Worked through increased tone and tenderness at mid scapula along Thoracic spine  -Supine sternum mobilization to ribs 2 and 3 sterno-costal junction. Right side more raised at manubrium  -Shoulder mobilization-posterior glide, gapping, caudal glide, inferior mobilization, posterior capsule mobilization, IR mobilization with strap and end range holds    Mechanical Traction trial:  Not today min at 15 pounds pressure with good results (not charged for    [employer] Portal  Treatment/Session Summary:    · Response to Treatment:  Patient shows improvement in thoracic spine mobility. Worked on shoulder mobilization and scapular mobility more today with re-education for shoulder intrinsics. · Communication/Consultation:  None today  · Equipment provided today:  None today  · Recommendations/Intent for next treatment session: Next visit will focus on ROM and soft tissue/joint mobility  · .     Total Treatment Billable Duration:  55 minutes  PT Patient Time In/Time Out  Time In: 0900  Time Out: 400 [facility], PT    Future Appointments   Date Time Provider Serg Kitchen   8/29/2019 10:00 AM Priyanka Wing, PT [facility]

## 2019-08-29 ENCOUNTER — HOSPITAL ENCOUNTER (OUTPATIENT)
Dept: PHYSICAL THERAPY | Age: 69
Discharge: HOME | End: 2019-08-29
Payer: MEDICARE

## 2019-08-29 PROCEDURE — 97164 PT RE-EVAL EST PLAN CARE: CPT

## 2019-08-29 PROCEDURE — 97140 MANUAL THERAPY 1/> REGIONS: CPT

## 2019-08-29 NOTE — PROGRESS NOTES
Ave Bonilla  : 1950  [place]  Secondary: [facility]  [address] [place] [address]  [phone]   [phone]          OUTPATIENT PHYSICAL THERAPY:Progress Report 2019   ICD-10: Treatment Diagnosis: Cervicalgia, M54.2  Precautions/Allergies:   Celebrex [celecoxib] and Sulfa (sulfonamide antibiotics)   TREATMENT PLAN:  Effective Dates: 2019 TO 2019 (90 days). Frequency/Duration: 2 times a week for for 4 weeks then one time a week for 8 weeks MEDICAL/REFERRING DIAGNOSIS:  neck Facet arthropathy, DDD cervical region  DATE OF ONSET: chronic  REFERRING PHYSICIAN: Chelsie LAW MD Orders: evaluate and treat  Return MD Appointment: 7/3/19   Ave Bonilla has been seen for 10 visits from 19 to 19 for cervicalgia. Patient has performed therapeutic exercises, activities, and had manual therapy to increased strength, ROM and function. Patient has also used modalities for pain control in order to increase function. Patient has show an increase in function per the NDI with scores of 4/50. Patient has progressed well toward their goals and will benefit from continuing skilled PT in order to address their impairments. Cathy Montejo, PT, DPT, OCS          INITIAL ASSESSMENT:  Mr. Jame Gunn presents with cervical spine pain due to cervical joint limitation and restricted mobility. He shows significant loss of left rotation with increased pain down the right side of his upper back. He is a good candidate for skilled PT in order to address listed impairments affecting his function. Cathy Montejo, PT, DPT, OCS      PROBLEM LIST (Impacting functional limitations):  1. Decreased Strength  2. Increased Pain  3. Decreased Activity Tolerance  4. Increased Fatigue  5.  Decreased Flexibility/Joint Mobility INTERVENTIONS PLANNED: (Treatment may consist of any combination of the following)  1. Cold  2. Cryotherapy  3. Electrical Stimulation  4. Heat  5. Manual Therapy  6. Neuromuscular Re-education/Strengthening  7. Range of Motion (ROM)  8. Therapeutic Activites  9. Therapeutic Exercise/Strengthening  10. Traction     GOALS: (Goals have been discussed and agreed upon with patient.)  Short-Term Functional Goals: Time Frame: 4 weeks  1. Patient will show a greater than 5% improvement in left cervical rotation without pain increase (MET)  2. Patient will show a greater than 1 finger increase in cervical flexion (MET)  3. Patient will be independent in [facility] for cervical mobility exercises (MET)  Discharge Goals: Time Frame: 12 weeks  1. Patient will report driving without pain increase in order to return to function (ongoing)  2. Patient will show a greater than 5 point decrease on the NDI in order to show an increase in function (ongoing)  3. Patient will return to golf without pain increase (MET)  4. Patient will be independent in cervical spine posture and stability training (MET)    OUTCOME MEASURE:   Tool Used: Neck Disability Index (NDI)  Score:  Initial: 7/50  Most Recent: 4/50 (Date: -- )   Interpretation of Score: The Neck Disability Index is a revised form of the Oswestry Low Back Pain Index and is designed to measure the activities of daily living in person's with neck pain. Each section is scored on a 0-5 scale, 5 representing the greatest disability. The scores of each section are added together for a total score of 50. MEDICAL NECESSITY:   · Patient is expected to demonstrate progress in strength, range of motion and functional technique to decrease symptoms. .  · Patient demonstrates good rehab potential due to higher previous functional level. · Skilled intervention continues to be required due to increased pain. REASON FOR SERVICES/OTHER COMMENTS:  · Patient continues to require skilled intervention due to increased pain and dysfunction.   Total Duration:  PT Patient Time In/Time Out  Time In: 0901  Time Out: 6337    Rehabilitation Potential For Stated Goals: Good  Regarding Jeanne Pritchard's therapy, I certify that the treatment plan above will be carried out by a therapist or under their direction. Thank you for this referral,  Rupal Yu, PT     Referring Physician Signature: Krystian Kinney D* _______________________________ Date _____________     PAIN/SUBJECTIVE:   Initial: Pain Intensity 1: 2 not much pain when not moving Post Session:  0/10   HISTORY:   History of Injury/Illness (Reason for Referral):  Patient reports chronic neck tightness with some pain, but started to have increased pain in early spring of this year with pain from the left shoulder blade into the neck. He feels it most with left rotation. He had previously had a fracture in his right shoulder of either the [facility] joint or shoulder itself that has limited some of his mobility, but not too much. X-rays shows increased disc degeneration in lower cervical spine. He does not report increased neurological signs or symptoms, but just a burning pain in the right side of his back and neck. The pain is inconsistent at this time. 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 Looking up also can cause his pain  Past Medical History/Comorbidities:   Mr. Dwight Barajas  has a past medical history of Detached retina (6/4/14), Detached retina (6/4/2014), Hyperlipidemia, and Tubular adenoma of colon (5/22/2017). Mr. Dwight Barajas  has a past surgical history that includes hx vein stripping (1982); hx colonoscopy (2008); hx knee arthroscopy (1999); hx cataract removal (12/09/2013); hx tonsillectomy; hx heent (1999); hx retinal detachment repair (Right, 5/6/14); and hx retinal detachment repair (02/05/2015).   Social History/Living Environment:       Social History     Socioeconomic History    Marital status:      Spouse name: Not on file    Number of children: Not on file    Years of education: Not on file    Highest education level: Not on file   Occupational History    Not on file   Social Needs    Financial resource strain: Not on file    Food insecurity:     Worry: Not on file     Inability: Not on file    Transportation needs:     Medical: Not on file     Non-medical: Not on file   Tobacco Use    Smoking status: Never Smoker    Smokeless tobacco: Never Used   Substance and Sexual Activity    Alcohol use: Yes     Alcohol/week: 13.3 standard drinks     Types: 6 Cans of beer, 10 Shots of liquor per week    Drug use: No    Sexual activity: Not on file   Lifestyle    Physical activity:     Days per week: Not on file     Minutes per session: Not on file    Stress: Not on file   Relationships    Social connections:     Talks on phone: Not on file     Gets together: Not on file     Attends Restorationist service: Not on file     Active member of club or organization: Not on file     Attends meetings of clubs or organizations: Not on file     Relationship status: Not on file    Intimate partner violence:     Fear of current or ex partner: Not on file     Emotionally abused: Not on file     Physically abused: Not on file     Forced sexual activity: Not on file   Other Topics Concern    Not on file   Social History Narrative    Not on file         Prior Level of Function/Work/Activity:  Independent, retired  Dominant Side:         RIGHT   Ambulatory/Rehab Services H2 Model Falls Risk Assessment   Risk Factors:       (1)  Gender [Male] Ability to Rise from Chair:       (0)  Ability to rise in a single movement   Falls Prevention Plan:       No modifications necessary   Total: (5 or greater = High Risk): 1   ©2010 [employer] [address] [place] [address] States Patent #0,381,363.  Federal Law prohibits the replication, distribution or use without written permission from [employer]   Current Medications:       Current Outpatient Medications:     diclofenac (VOLTAREN) 1 % gel, Apply 4 g to affected area four (4) times daily. , Disp: 3 Each, Rfl: 3    montelukast (SINGULAIR) 10 mg tablet, Take 1 Tab by mouth daily. , Disp: 90 Tab, Rfl: 3    polyethylene glycol (MIRALAX) 17 gram packet, Take 1 Packet by mouth daily. Indications: constipation, Disp: 1 Each, Rfl: 0    aspirin 81 mg chewable tablet, Take 81 mg by mouth daily. , Disp: , Rfl:    Date Last Reviewed:  8/29/2019     Number of Personal Factors/Comorbidities that affect the Plan of Care: 0: LOW COMPLEXITY   EXAMINATION:   Observation/Orthostatic Postural Assessment:          Patient shows increased forward head and rounded shoulders posture. Cervical spine appears flattened as well as thoracic spine. Increased tone noted along cervical spine. Patient shows elevated clavicle on the right side with right AC joint a little more prominent. Patient has increased thoracic cage left rotation and pelvic and lumbar right rotation.   Palpation:          Upper cervical- shows increased soft tissue and fascia restrictions with decreased mobility throughout sib-occipitals as well as paraspinals  -SCM and anterior cervical spine also restricted due to posture and position.  -Decreased lateral side glide through lower cervical spine from left to right more  -most of cervical rotation comes from upper c-spine with little to no motion occurring going left in lower cervical spine and CT junction  -Limited first rib mobility on right and left side.  -Increased facet joint stiffness throughout lower cervical spine for extension testing  -Flexion along thoracic spine limited as well as extension with decreased curvature  -Limitation in palm down abduction through [facility] joint on right side  ROM:          Cervical ROM: Rotation R-60%, L-50%  Side bending: R 5 fingers with pain on left side Left at 5 fingers without not as much pain  Flexion to 3 fingers from sternum and extension mostly at upper cervical spine  Strength:          5/5 for B UE except for right shoulder ER at 4/5  Special Tests:          Cervical stability/vertebral artery tests:   1. Sharper Pursor: (-)  2. Alar ligament: (-)  3. Shear test: (-)  4. Tectorial membrane distraction:(-)  5. Transverse ligament lift up test: (-)      Neurological Screen:        Myotomes:  Limited in C5-6        Neural Tension Tests:  (+) for cervical tension   Body Structures Involved:  1. Nerves  2. Thoracic Cage  3. Bones  4. Joints  5. Muscles  6. Ligaments Body Functions Affected:  1. Neuromusculoskeletal  2. Movement Related Activities and Participation Affected:  1. General Tasks and Demands  2. Mobility  3. Domestic Life  4.  Interpersonal Interactions and Relationships   Number of elements (examined above) that affect the Plan of Care: 4+: HIGH COMPLEXITY   CLINICAL PRESENTATION:   Presentation: Stable and uncomplicated: LOW COMPLEXITY   CLINICAL DECISION MAKING:   Use of outcome tool(s) and clinical judgement create a POC that gives a: Clear prediction of patient's progress: LOW COMPLEXITY

## 2019-08-29 NOTE — PROGRESS NOTES
Ave Irene  : 1950  [place]  Secondary: [facility]  [address] [place] [address]  [phone]   [phone]        OUTPATIENT PHYSICAL THERAPY: Daily Treatment Note 2019  Visit Count:  14    ICD-10: Treatment Diagnosis: Cervicalgia, M54.2  Precautions/Allergies:   Celebrex [celecoxib] and Sulfa (sulfonamide antibiotics)   TREATMENT PLAN:  Effective Dates: 2019 TO 2019 (90 days). Frequency/Duration: 1 time every two weeks for 8 weeks then once a month for 4 weeks    Pre-treatment Symptoms/Complaints:  Patient reports that the neck and all did great on the trip, but lifting the luggage when getting back this past Monday caused a good bit of shoulder pain where he could not lift it without pain on Tuesday  Pain: Initial: Pain Intensity 1: 1 /10 Post Session:  0/10   Medications Last Reviewed:  2019  Updated Objective Findings:  See evaluation note from today  TREATMENT:     THERAPEUTIC EXERCISE: (reviewed minutes):  Exercises per grid below to improve mobility, strength and coordination. Required minimal visual, verbal, manual and tactile cues to promote proper body alignment, promote proper body posture, promote proper body mechanics and promote proper body breathing techniques. Progressed resistance, range, repetitions and complexity of movement as indicated. Date:  2019     Activity/Exercise Parameters   Axial elongation Supine 5 x 15 sec hold with traction applied manually   Sitting thoracic spine  mobility Flexion and extension through spine x 5 min for education and training.   Stopped hands and knees due to knee pain   Upper trap stretch Sitting 3 x 30 sec   B ER Red band x 30   Sleeper stretch 3 x 1 min with light hold for shoulder IR               MANUAL THERAPY: (45 minutes): Joint mobilization and Soft tissue mobilization was utilized and necessary because of the patient's restricted joint motion, loss of articular motion and restricted motion of soft tissue.   -Supine soft tissue mobilization to superficial fascia of posterior and lateral cervical spine, groove of spine, sub-occipitals, anterior cervical spine, upper trap   -traction applied to relieve symptoms and tightness throughout treatment   -Cervical side glide right to left to improve overall cervical mobility.  -Side lie scapula mobility into posterior depression  -Side lie latissimus mobilization  -Worked through increased tone and tenderness at mid scapula along Thoracic spine  -Supine sternum mobilization to ribs 2 and 3 sterno-costal junction. Right side more raised at manubrium  -Shoulder mobilization-posterior glide, gapping, caudal glide, inferior mobilization, posterior capsule mobilization, IR mobilization with strap and end range holds    Mechanical Traction trial:  Not today min at 15 pounds pressure with good results (not charged for    [employer] Portal  Treatment/Session Summary:    · Response to Treatment:  Patient shows improvement in thoracic spine mobility. Worked on shoulder mobilization and scapular mobility more today with re-education for shoulder intrinsics. Continue to follow up in two weeks  · Communication/Consultation:  None today  · Equipment provided today:  None today  · Recommendations/Intent for next treatment session: Next visit will focus on ROM and soft tissue/joint mobility  · .     Total Treatment Billable Duration:  45 minutes  PT Patient Time In/Time Out  Time In: 1000  Time Out: YVETTE Sethi    Future Appointments   Date Time Provider Serg Kitchen   9/16/2019  2:00 PM Hilda Tadeo PT [facility]

## 2019-09-16 ENCOUNTER — HOSPITAL ENCOUNTER (OUTPATIENT)
Dept: PHYSICAL THERAPY | Age: 69
Discharge: HOME | End: 2019-09-16
Payer: MEDICARE

## 2019-09-16 PROCEDURE — 97140 MANUAL THERAPY 1/> REGIONS: CPT

## 2019-09-16 NOTE — THERAPY DISCHARGE
Missael Panchal  : 1950  [place]  Secondary: [facility] at [address]  [phone]   [phone]          OUTPATIENT PHYSICAL THERAPY:Discharge Summary 2019   ICD-10: Treatment Diagnosis: Cervicalgia, M54.2  Pain in joint, right shoulder, M25.511  Precautions/Allergies:   Celebrex [celecoxib] and Sulfa (sulfonamide antibiotics)   TREATMENT PLAN:  Effective Dates: 2019 TO 2019 (90 days). Frequency/Duration: 1 time every other week for 8 weeks, then one time the last 4 weeks MEDICAL/REFERRING DIAGNOSIS:  neck Facet arthropathy, DDD cervical region  DATE OF ONSET: chronic  REFERRING PHYSICIAN: Rose LAW MD Orders: evaluate and treat  Return MD Appointment: 7/3/19   Missael Panchal has been seen for 15 visits from 19 to 19 for cervicalgia. Patient has performed therapeutic exercises, activities, and had manual therapy to increased strength, ROM and function. Patient has also used modalities for pain control in order to increase function. Patient has shown an increase in function per the NDI with scores of 7/50. Patient has partially met his goals for therapy at this time. He will be discharged at this time due to moving out of town. Kyree Hall, PT, DPT, OCS          INITIAL ASSESSMENT:  Mr. Mary Beth López presents with cervical spine pain due to cervical joint limitation and restricted mobility. He shows significant loss of left rotation with increased pain down the right side of his upper back. He is a good candidate for skilled PT in order to address listed impairments affecting his function. Kyree Hall, PT, DPT, OCS      PROBLEM LIST (Impacting functional limitations):  1. Decreased Strength  2. Increased Pain  3. Decreased Activity Tolerance  4. Increased Fatigue  5.  Decreased Flexibility/Joint Mobility INTERVENTIONS PLANNED: (Treatment may consist of any combination of the following)  1. Cold  2. Cryotherapy  3. Electrical Stimulation  4. Heat  5. Manual Therapy  6. Neuromuscular Re-education/Strengthening  7. Range of Motion (ROM)  8. Therapeutic Activites  9. Therapeutic Exercise/Strengthening  10. Traction     GOALS: (Goals have been discussed and agreed upon with patient.)  Short-Term Functional Goals: Time Frame: 4 weeks  1. Patient will show a greater than 5% improvement in left cervical rotation without pain increase (MET)  2. Patient will show a greater than 1 finger increase in cervical flexion (MET)  3. Patient will be independent in [facility] for cervical mobility exercises (MET)  Discharge Goals: Time Frame: 12 weeks  1. Patient will report driving without pain increase in order to return to function (MET)  2. Patient will show a greater than 5 point decrease on the NDI in order to show an increase in function (not met)  3. Patient will return to golf without pain increase (MET)  4. Patient will be independent in cervical spine posture and stability training (MET)    OUTCOME MEASURE:   Tool Used: Neck Disability Index (NDI)  Score:  Initial: 7/50  Most Recent: 7/50 (Date: 8/29/19 )   Interpretation of Score: The Neck Disability Index is a revised form of the Oswestry Low Back Pain Index and is designed to measure the activities of daily living in person's with neck pain. Each section is scored on a 0-5 scale, 5 representing the greatest disability. The scores of each section are added together for a total score of 50. MEDICAL NECESSITY:   · Patient is expected to demonstrate progress in strength, range of motion and functional technique to decrease symptoms. .  · Patient demonstrates good rehab potential due to higher previous functional level. · Skilled intervention continues to be required due to increased pain.   REASON FOR SERVICES/OTHER COMMENTS:  · Patient continues to require skilled intervention due to increased pain and dysfunction. Total Duration:  PT Patient Time In/Time Out  Time In: 1355  Time Out: 1445         PAIN/SUBJECTIVE:   Initial: Pain Intensity 1: 0 not much pain when not moving Post Session:  0/10   HISTORY:   History of Injury/Illness (Reason for Referral):  Patient reports chronic neck tightness with some pain, but started to have increased pain in early spring of this year with pain from the left shoulder blade into the neck. He feels it most with left rotation. He had previously had a fracture in his right shoulder of either the [facility] joint or shoulder itself that has limited some of his mobility, but not too much. X-rays shows increased disc degeneration in lower cervical spine. He does not report increased neurological signs or symptoms, but just a burning pain in the right side of his back and neck. The pain is inconsistent at this time. 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 Looking up also can cause his pain  Past Medical History/Comorbidities:   Mr. Catalina Chilel  has a past medical history of Detached retina (6/4/14), Detached retina (6/4/2014), Hyperlipidemia, and Tubular adenoma of colon (5/22/2017). Mr. Catalina Chilel  has a past surgical history that includes hx vein stripping (1982); hx colonoscopy (2008); hx knee arthroscopy (1999); hx cataract removal (12/09/2013); hx tonsillectomy; hx heent (1999); hx retinal detachment repair (Right, 5/6/14); and hx retinal detachment repair (02/05/2015).   Social History/Living Environment:       Social History     Socioeconomic History    Marital status:      Spouse name: Not on file    Number of children: Not on file    Years of education: Not on file    Highest education level: Not on file   Occupational History    Not on file   Social Needs    Financial resource strain: Not on file    Food insecurity:     Worry: Not on file     Inability: Not on file    Transportation needs:     Medical: Not on file Non-medical: Not on file   Tobacco Use    Smoking status: Never Smoker    Smokeless tobacco: Never Used   Substance and Sexual Activity    Alcohol use: Yes     Alcohol/week: 13.3 standard drinks     Types: 6 Cans of beer, 10 Shots of liquor per week    Drug use: No    Sexual activity: Not on file   Lifestyle    Physical activity:     Days per week: Not on file     Minutes per session: Not on file    Stress: Not on file   Relationships    Social connections:     Talks on phone: Not on file     Gets together: Not on file     Attends Church service: Not on file     Active member of club or organization: Not on file     Attends meetings of clubs or organizations: Not on file     Relationship status: Not on file    Intimate partner violence:     Fear of current or ex partner: Not on file     Emotionally abused: Not on file     Physically abused: Not on file     Forced sexual activity: Not on file   Other Topics Concern    Not on file   Social History Narrative    Not on file         Prior Level of Function/Work/Activity:  Independent, retired  Dominant Side:         RIGHT   Ambulatory/Rehab Services H2 Model Falls Risk Assessment   Risk Factors:       (1)  Gender [Male] Ability to Rise from Chair:       (0)  Ability to rise in a single movement   Falls Prevention Plan:       No modifications necessary   Total: (5 or greater = High Risk): 1   ©2010 [facility] [place] [address] [place] [address] Patent #[id number]. Federal Law prohibits the replication, distribution or use without written permission from [facility] [employer]   Current Medications:       Current Outpatient Medications:     predniSONE (STERAPRED) 5 mg dose pack, See administration instruction per 5mg dose pack, Disp: 21 Tab, Rfl: 0    betamethasone valerate (VALISONE) 0.1 % ointment, Apply  to affected area three (3) times daily. , Disp: 15 g, Rfl: 1    diclofenac (VOLTAREN) 1 % gel, Apply 4 g to affected area four (4) times daily., Disp: 3 Each, Rfl: 3    montelukast (SINGULAIR) 10 mg tablet, Take 1 Tab by mouth daily. , Disp: 90 Tab, Rfl: 3    polyethylene glycol (MIRALAX) 17 gram packet, Take 1 Packet by mouth daily. Indications: constipation, Disp: 1 Each, Rfl: 0    aspirin 81 mg chewable tablet, Take 81 mg by mouth daily. , Disp: , Rfl:    Date Last Reviewed:  9/16/2019     Number of Personal Factors/Comorbidities that affect the Plan of Care: 0: LOW COMPLEXITY   EXAMINATION:   Observation/Orthostatic Postural Assessment:          Patient shows increased forward head and rounded shoulders posture. Cervical spine appears flattened as well as thoracic spine. Increased tone noted along cervical spine. Patient shows elevated clavicle on the right side with right AC joint a little more prominent. Patient has increased thoracic cage left rotation and pelvic and lumbar right rotation.   Palpation:          Upper cervical- shows increased soft tissue and fascia restrictions with decreased mobility throughout sib-occipitals as well as paraspinals  -SCM and anterior cervical spine also restricted due to posture and position.  -Decreased lateral side glide through lower cervical spine from left to right more  -most of cervical rotation comes from upper c-spine with little to no motion occurring going left in lower cervical spine and CT junction  -Limited first rib mobility on right and left side.  -Increased facet joint stiffness throughout lower cervical spine for extension testing  -Flexion along thoracic spine limited as well as extension with decreased curvature  -Limitation in palm down abduction through [facility] joint on right side  ROM:          Cervical ROM: Rotation R-60%, L-50%  Side bending: R 5 fingers with pain on left side Left at 5 fingers without not as much pain  Flexion to 3 fingers from sternum and extension mostly at upper cervical spine  Strength:          5/5 for B UE except for right shoulder ER at 4/5  Special Tests: Cervical stability/vertebral artery tests:   1. Sharper Pursor: (-)  2. Alar ligament: (-)  3. Shear test: (-)  4. Tectorial membrane distraction:(-)  5. Transverse ligament lift up test: (-)      Neurological Screen:        Myotomes:  Limited in C5-6        Neural Tension Tests:  (+) for cervical tension   Body Structures Involved:  1. Nerves  2. Thoracic Cage  3. Bones  4. Joints  5. Muscles  6. Ligaments Body Functions Affected:  1. Neuromusculoskeletal  2. Movement Related Activities and Participation Affected:  1. General Tasks and Demands  2. Mobility  3. Domestic Life  4.  Interpersonal Interactions and Relationships   Number of elements (examined above) that affect the Plan of Care: 4+: HIGH COMPLEXITY   CLINICAL PRESENTATION:   Presentation: Stable and uncomplicated: LOW COMPLEXITY   CLINICAL DECISION MAKING:   Use of outcome tool(s) and clinical judgement create a POC that gives a: Clear prediction of patient's progress: LOW COMPLEXITY

## 2019-09-16 NOTE — PROGRESS NOTES
Antelmo Kuhn  : 1950  [place]  Secondary: [facility]  [address] [place] [address]  [phone]   [phone]        OUTPATIENT PHYSICAL THERAPY: Daily Treatment Note 2019  Visit Count:  15    ICD-10: Treatment Diagnosis: Cervicalgia, M54.2  Precautions/Allergies:   Celebrex [celecoxib] and Sulfa (sulfonamide antibiotics)   TREATMENT PLAN:  Effective Dates: 2019 TO 2019 (90 days). Frequency/Duration: 1 time every two weeks for 8 weeks then once a month for 4 weeks    Pre-treatment Symptoms/Complaints:  Patient reports that the shoulder is much better since last time and that he will be moving in two weeks to [place]. Pain: Initial: Pain Intensity 1: 0 /10 Post Session:  0/10   Medications Last Reviewed:  2019  Updated Objective Findings:  See evaluation note from today  TREATMENT:     THERAPEUTIC EXERCISE: (reviewed minutes):  Exercises per grid below to improve mobility, strength and coordination. Required minimal visual, verbal, manual and tactile cues to promote proper body alignment, promote proper body posture, promote proper body mechanics and promote proper body breathing techniques. Progressed resistance, range, repetitions and complexity of movement as indicated. Date:  2019     Activity/Exercise Parameters   Axial elongation Supine 5 x 15 sec hold with traction applied manually   Sitting thoracic spine  mobility Flexion and extension through spine x 5 min for education and training.   Stopped hands and knees due to knee pain   Upper trap stretch Sitting 3 x 30 sec   B ER Red band x 30   Sleeper stretch 3 x 1 min with light hold for shoulder IR               MANUAL THERAPY: (45 minutes): Joint mobilization and Soft tissue mobilization was utilized and necessary because of the patient's restricted joint motion, loss of articular motion and restricted motion of soft tissue.   -Supine soft tissue mobilization to superficial fascia of posterior and lateral cervical spine, groove of spine, sub-occipitals, anterior cervical spine, upper trap   -traction applied to relieve symptoms and tightness throughout treatment   -Cervical side glide right to left to improve overall cervical mobility.  -Side lie scapula mobility into posterior depression  -Side lie latissimus mobilization  -Worked through increased tone and tenderness at mid scapula along Thoracic spine  -Supine sternum mobilization to ribs 2 and 3 sterno-costal junction. Right side more raised at manubrium  -Shoulder mobilization-posterior glide, gapping, caudal glide, inferior mobilization, posterior capsule mobilization, IR mobilization with strap and end range holds    Mechanical Traction trial:  Not today min at 15 pounds pressure with good results (not charged for    [employer]  Treatment/Session Summary:    · Response to Treatment:  Patient shows improvement in thoracic spine mobility. We addressed all concerns and patient will be discharged at this time due to moving out of town  ·   · . Total Treatment Billable Duration:  45 minutes  PT Patient Time In/Time Out  Time In: 1355  Time Out: [address], PT    No future appointments.

## 2019-09-18 ENCOUNTER — APPOINTMENT (RX ONLY)
Dept: URBAN - METROPOLITAN AREA CLINIC 23 | Facility: CLINIC | Age: 69
Setting detail: DERMATOLOGY
End: 2019-09-18

## 2019-09-18 DIAGNOSIS — K13.0 DISEASES OF LIPS: ICD-10-CM

## 2019-09-18 DIAGNOSIS — L82.1 OTHER SEBORRHEIC KERATOSIS: ICD-10-CM

## 2019-09-18 DIAGNOSIS — L57.0 ACTINIC KERATOSIS: ICD-10-CM

## 2019-09-18 DIAGNOSIS — D485 NEOPLASM OF UNCERTAIN BEHAVIOR OF SKIN: ICD-10-CM

## 2019-09-18 PROBLEM — D48.5 NEOPLASM OF UNCERTAIN BEHAVIOR OF SKIN: Status: ACTIVE | Noted: 2019-09-18

## 2019-09-18 PROBLEM — L70.0 ACNE VULGARIS: Status: ACTIVE | Noted: 2019-09-18

## 2019-09-18 PROCEDURE — ? BIOPSY BY SHAVE METHOD

## 2019-09-18 PROCEDURE — 11102 TANGNTL BX SKIN SINGLE LES: CPT

## 2019-09-18 PROCEDURE — 17000 DESTRUCT PREMALG LESION: CPT | Mod: 59

## 2019-09-18 PROCEDURE — ? COUNSELING

## 2019-09-18 PROCEDURE — 99213 OFFICE O/P EST LOW 20 MIN: CPT | Mod: 25

## 2019-09-18 PROCEDURE — ? PRESCRIPTION

## 2019-09-18 PROCEDURE — ? LIQUID NITROGEN

## 2019-09-18 RX ORDER — HYDROCORTISONE AND IODOQUINOL 10; 10 MG/G; MG/G
CREAM TOPICAL
Qty: 1 | Refills: 2 | Status: ERX | COMMUNITY
Start: 2019-09-18

## 2019-09-18 RX ADMIN — HYDROCORTISONE AND IODOQUINOL: 10; 10 CREAM TOPICAL at 13:06

## 2019-09-18 ASSESSMENT — LOCATION DETAILED DESCRIPTION DERM
LOCATION DETAILED: RIGHT DISTAL DORSAL FOREARM
LOCATION DETAILED: RIGHT VENTRAL PROXIMAL FOREARM
LOCATION DETAILED: RIGHT ORAL COMMISSURE
LOCATION DETAILED: LEFT ORAL COMMISSURE
LOCATION DETAILED: RIGHT VENTRAL DISTAL FOREARM
LOCATION DETAILED: RIGHT PROXIMAL RADIAL DORSAL FOREARM
LOCATION DETAILED: LEFT CENTRAL MALAR CHEEK

## 2019-09-18 ASSESSMENT — LOCATION SIMPLE DESCRIPTION DERM
LOCATION SIMPLE: LEFT CHEEK
LOCATION SIMPLE: RIGHT FOREARM
LOCATION SIMPLE: RIGHT LIP
LOCATION SIMPLE: LEFT LIP

## 2019-09-18 ASSESSMENT — LOCATION ZONE DERM
LOCATION ZONE: LIP
LOCATION ZONE: FACE
LOCATION ZONE: ARM

## 2019-09-18 NOTE — PROCEDURE: BIOPSY BY SHAVE METHOD
Biopsy Type: H and E
Destruction After The Procedure: No
Additional Anesthesia Volume In Cc (Will Not Render If 0): 0
Outside Pathology Billing: outside pathology read only
Type Of Destruction Used: Curettage
Biopsy Method: Dermablade
Path Notes (To The Dermatopathologist): .
Billing Type: Third-Party Bill
Anesthesia Volume In Cc: 0.5
Notification Instructions: Patient will be notified of biopsy results. However, patient instructed to call the office if not contacted within 2 weeks.
Depth Of Biopsy: dermis
Dressing: pressure dressing with telfa
Electrodesiccation And Curettage Text: The wound bed was treated with electrodesiccation and curettage after the biopsy was performed.
Was A Bandage Applied: Yes
Wound Care: Vaseline
Hemostasis: Electrocautery
Cryotherapy Text: The wound bed was treated with cryotherapy after the biopsy was performed.
Silver Nitrate Text: The wound bed was treated with silver nitrate after the biopsy was performed.
Electrodesiccation Text: The wound bed was treated with electrodesiccation after the biopsy was performed.
Anesthesia Type: 1% lidocaine with 1:200,000 epinephrine and a 1:10 solution of 8.4% sodium bicarbonate
Consent: Written consent was obtained and risks were reviewed including but not limited to scarring, infection, bleeding, scabbing, incomplete removal, nerve damage and allergy to anesthesia.
Accession #: pc
Post-Care Instructions: I reviewed with the patient in detail post-care instructions. Patient is to keep the biopsy site dry overnight, and then apply bacitracin twice daily until healed. Patient may apply hydrogen peroxide soaks to remove any crusting.
Detail Level: Detailed

## 2019-09-18 NOTE — PROCEDURE: LIQUID NITROGEN
Render Note In Bullet Format When Appropriate: No
Duration Of Freeze Thaw-Cycle (Seconds): 5
Detail Level: Detailed
Consent: The patient's verbal consent was obtained including but not limited to risks of crusting, scabbing, blistering, scarring, darker or lighter pigmentary change, recurrence, incomplete removal and infection.
Post-Care Instructions: I reviewed with the patient in detail post-care instructions. Patient is to wear sunprotection, and avoid picking at any of the treated lesions. Pt may apply Vaseline to crusted or scabbing areas. Will re-check at follow up
Number Of Freeze-Thaw Cycles: 1 freeze-thaw cycle

## 2019-09-24 ENCOUNTER — APPOINTMENT (RX ONLY)
Dept: URBAN - METROPOLITAN AREA CLINIC 24 | Facility: CLINIC | Age: 69
Setting detail: DERMATOLOGY
End: 2019-09-24

## 2019-09-24 PROBLEM — D04.61 CARCINOMA IN SITU OF SKIN OF RIGHT UPPER LIMB, INCLUDING SHOULDER: Status: ACTIVE | Noted: 2019-09-24

## 2019-09-24 PROCEDURE — ? CURETTAGE AND DESTRUCTION

## 2019-09-24 PROCEDURE — 17262 DSTRJ MAL LES T/A/L 1.1-2.0: CPT | Mod: 79

## 2019-09-24 NOTE — PROCEDURE: CURETTAGE AND DESTRUCTION
Post-Care Instructions: I reviewed with the patient in detail post-care instructions. Patient is to keep the area dry for 48 hours, and not to engage in any swimming until the area is healed. Should the patient develop any fevers, chills, bleeding, severe pain patient will contact the office immediately.
Bill As A Line Item Or As Units: Line Item
Bill For Surgical Tray: no
Detail Level: Detailed
Anesthesia Volume In Cc: 3
Additional Information: (Optional): The wound was cleaned, and a pressure dressing was applied.  The patient received detailed post-op instructions.
Size Of Lesion After Curettage: 1.6
Consent was obtained from the patient. The risks, benefits and alternatives to therapy were discussed in detail. Specifically, the risks of infection, scarring, bleeding, prolonged wound healing, nerve injury, incomplete removal, allergy to anesthesia and recurrence were addressed. Alternatives to ED&C, such as: surgical removal and XRT were also discussed.  Prior to the procedure, the treatment site was clearly identified and confirmed by the patient.
Anesthesia Type: 1% lidocaine with epinephrine
Size Of Lesion In Cm: 0.7
Cautery Type: electrodesiccation
Total Volume (Ccs): 1
What Was Performed First?: Curettage

## 2024-05-08 NOTE — PROGRESS NOTES
Assessment/Plan:    1. Type 2 diabetes mellitus without complication, without long-term current use of insulin ([facility])  Assessment & Plan:  Will be decreasing ozempic - will need to watch A1c  Lab Results   Component Value Date    HGBA1C 7.9 (H) 04/16/2024     Orders:  -     semaglutide, 1 mg/dose, (Ozempic) 4 mg/3 mL injection pen; Inject 0.75 mL (1 mg total) under the skin once a week    2. Myalgia    3. Essential hypertension  Assessment & Plan:  stable              There are no Patient Instructions on file for this visit.    No follow-ups on file.    Subjective:      Patient ID: Nik Meier is a 58 y.o. male.    Chief Complaint   Patient presents with   • Medication Management     Sas/cma       Pt states his hands hurt when he uses them .  Also has pain in the back of his legs.  Seems to have started when the dose of the ozempic was increased  Not talking about numbness and tingling , he is talking about pain  Does not wake him up at night  Not a problem when he drives          The following portions of the patient's history were reviewed and updated as appropriate: allergies, current medications, past family history, past medical history, past social history, past surgical history and problem list.    Review of Systems   Constitutional:  Negative for activity change, appetite change, chills, diaphoresis, fatigue, fever and unexpected weight change.   HENT:  Negative for congestion, dental problem, ear pain, mouth sores, sinus pressure, sinus pain, sore throat and trouble swallowing.    Eyes:  Negative for photophobia, discharge and itching.   Respiratory:  Negative for apnea, chest tightness and shortness of breath.    Cardiovascular:  Negative for chest pain, palpitations and leg swelling.   Gastrointestinal:  Negative for abdominal distention, abdominal pain, blood in stool, nausea and vomiting.   Endocrine: Negative for cold intolerance, heat intolerance, polydipsia, polyphagia and polyuria.  Babs Dumont  : 1950  Primary: Anita Nieto [facility]  Secondary: [place] [facility]  [address] [place] [address]  [phone]   [phone]        OUTPATIENT PHYSICAL THERAPY: Daily Treatment Note 2019  Visit Count:  5    ICD-10: Treatment Diagnosis: Cervicalgia, M54.2  Precautions/Allergies:   Celebrex [celecoxib] and Sulfa (sulfonamide antibiotics)   TREATMENT PLAN:  Effective Dates: 2019 TO 2019 (90 days). Frequency/Duration: 2 times a week for for 4 weeks then one time a week for 8 weeks    Pre-treatment Symptoms/Complaints:  Patient reports he has been feeling better in the back and neck, but the left knee has been really painful and swollen. He goes to see Dr. Charisse lBum Tomorrow   Pain: Initial: Pain Intensity 1: 1 /10 Post Session:  0/10   Medications Last Reviewed:  2019  Updated Objective Findings:  See evaluation note from today  TREATMENT:     THERAPEUTIC EXERCISE: (reviewed minutes):  Exercises per grid below to improve mobility, strength and coordination. Required minimal visual, verbal, manual and tactile cues to promote proper body alignment, promote proper body posture, promote proper body mechanics and promote proper body breathing techniques. Progressed resistance, range, repetitions and complexity of movement as indicated. Date:  2019     Activity/Exercise Parameters   Axial elongation Supine 5 x 15 sec hold with traction applied manually   Sitting thoracic spine  mobility Flexion and extension through spine x 5 min for education and training.   Stopped hands and knees due to knee pain                           MANUAL THERAPY: (45 minutes): Joint mobilization and Soft tissue mobilization was utilized and necessary because of the patient's restricted joint motion, loss of articular motion and restricted motion of soft tissue.   -Supine soft tissue mobilization to superficial fascia of   Genitourinary:  Negative for difficulty urinating.   Musculoskeletal:  Positive for myalgias. Negative for arthralgias.   Skin:  Negative for color change and wound.   Neurological:  Negative for dizziness, syncope, speech difficulty and headaches.   Hematological:  Negative for adenopathy.   Psychiatric/Behavioral:  Negative for agitation and behavioral problems.          Current Outpatient Medications   Medication Sig Dispense Refill   • amLODIPine (NORVASC) 10 mg tablet Take 1 tablet (10 mg total) by mouth daily 90 tablet 1   • metFORMIN (GLUCOPHAGE) 1000 MG tablet Take 1 tablet (1,000 mg total) by mouth 2 (two) times a day with meals 180 tablet 1   • multivitamin-minerals (CENTRUM ADULTS) tablet Take 1 tablet by mouth daily 30 tablet 0   • olmesartan-hydrochlorothiazide (BENICAR HCT) 40-12.5 MG per tablet Take 1 tablet by mouth daily 90 tablet 1   • rosuvastatin (CRESTOR) 20 MG tablet Take 1 tablet (20 mg total) by mouth daily 90 tablet 1   • semaglutide, 1 mg/dose, (Ozempic) 4 mg/3 mL injection pen Inject 0.75 mL (1 mg total) under the skin once a week 3 mL 5     No current facility-administered medications for this visit.       Objective:    /72   Pulse 102   Temp 97.9 °F (36.6 °C)   Resp 18   Wt 102 kg (225 lb)   BMI 36.32 kg/m²        Physical Exam  Vitals and nursing note reviewed.   Constitutional:       General: He is not in acute distress.     Appearance: He is well-developed. He is not diaphoretic.   HENT:      Head: Normocephalic and atraumatic.      Right Ear: External ear normal.      Left Ear: External ear normal.      Nose: Nose normal.      Mouth/Throat:      Pharynx: No oropharyngeal exudate.   Eyes:      General: No scleral icterus.        Right eye: No discharge.         Left eye: No discharge.      Pupils: Pupils are equal, round, and reactive to light.   Neck:      Thyroid: No thyromegaly.   Cardiovascular:      Rate and Rhythm: Normal rate.      Heart sounds: Normal heart  posterior and lateral cervical spine, groove of spine, sub-occipitals, anterior cervical spine, upper trap   -traction applied to relieve symptoms and tightness throughout treatment   -Cervical side glide right to left to improve overall cervical mobility.  -Side lie scapula mobility into posterior depression  -Worked through increased tone and tenderness at mid scapula along Thoracic spine    Mechanical Traction trial: 10 min at 15 pounds pressure with good results    Med[employer] Portal  Treatment/Session Summary:    · Response to Treatment:  Patient reports good feeling with traction today. Continue with upper cervical mobility. Discussed icing for his left knee  · Communication/Consultation:  None today  · Equipment provided today:  None today  · Recommendations/Intent for next treatment session: Next visit will focus on ROM and soft tissue/joint mobility  · .     Total Treatment Billable Duration:  55 minutes  PT Patient Time In/Time Out  Time In: 1300  Time Out: [address], PT    Future Appointments   Date Time Provider Serg Kitchen   6/28/2019  9:00 AM Khari Villanueva, YVETTE [facility]   7/1/2019  2:00 PM Khari Villanueva PT [facility]   7/2/2019  2:00 PM Natalee Verde MD [facility]   7/3/2019  9:00 AM Chano Velazquez, PT [facility] sounds. No murmur heard.  Pulmonary:      Effort: Pulmonary effort is normal. No respiratory distress.      Breath sounds: Normal breath sounds. No wheezing.   Abdominal:      General: Bowel sounds are normal. There is no distension.      Palpations: Abdomen is soft. There is no mass.      Tenderness: There is no abdominal tenderness. There is no guarding or rebound.   Musculoskeletal:         General: Normal range of motion.   Skin:     General: Skin is warm and dry.      Findings: No erythema or rash.   Neurological:      Mental Status: He is alert.      Coordination: Coordination normal.      Deep Tendon Reflexes: Reflexes normal.   Psychiatric:         Behavior: Behavior normal.                Frank Lombardi, DO

## (undated) DEVICE — PAD,EYE,1-5/8X2 5/8,STERILE,LF,1/PK: Brand: MEDLINE

## (undated) DEVICE — CARDINAL HEALTH FLEXIBLE LIGHT HANDLE COVER: Brand: CARDINAL HEALTH

## (undated) DEVICE — EYE PADSSTERILENOT MADE WITH NATURAL RUBBER LATEXSINGLE USE ONLYDO NOT USE IF PACKAGE OPENED OR DAMAGED: Brand: CARDINAL HEALTH

## (undated) DEVICE — Device

## (undated) DEVICE — SOLUTION IRRIGATION BAL SALT SOLUTION 500 ML BTL 6/CA BSS +

## (undated) DEVICE — ADVANCED DSP BACKFLUSH BLUNT, 25G: Brand: ALCON GRIESHABER

## (undated) DEVICE — DISPOSABLE BIPOLAR PENCIL 5" (12.7CM) 25 GAUGE STRAIGHT: Brand: KIRWAN

## (undated) DEVICE — SURGICAL PROCEDURE PACK EYE CDS

## (undated) DEVICE — SYR 10ML LUER LOK 1/5ML GRAD --

## (undated) DEVICE — NEEDLE HYPO 27GA L1.25IN GRY POLYPR HUB S STL REG BVL STR

## (undated) DEVICE — IRRIGATION KT OPHTH 80IN --

## (undated) DEVICE — (D)STRIP SKN CLSR 0.5X4IN WHT --

## (undated) DEVICE — Z DISCONTINUED NO SUB IDED SHIELD EYE PLAS RT BGE M 7.5CMX5.7CM VISITEC

## (undated) DEVICE — SHEET, DRAPE, SPLIT, STERILE: Brand: MEDLINE

## (undated) DEVICE — SOLUTION IRRIGATION BAL SALT SOLUTION 500 ML STRL BSS

## (undated) DEVICE — DISPOSABLE BIPOLAR CODE, 12' (3.66 M): Brand: CONMED

## (undated) DEVICE — Device: Brand: MILLEX-OR

## (undated) DEVICE — VGFI TUBING SET: Brand: VGFI

## (undated) DEVICE — 6 ML SYRINGE WITH STANDARD HYPODERMIC NEEDLE: Brand: MONOJECT

## (undated) DEVICE — SYR 5ML 1/5 GRAD LL NSAF LF --

## (undated) DEVICE — SUT VCRL 8-0 12IN TG1408 VIO --

## (undated) DEVICE — BETADINE 5% EYE SOL

## (undated) DEVICE — 3M™ TEGADERM™ TRANSPARENT FILM DRESSING FRAME STYLE, 1628, 6 IN X 8 IN (15 CM X 20 CM), 10/CT 8CT/CASE: Brand: 3M™ TEGADERM™

## (undated) DEVICE — SOLUTION IRRIG 1000ML H2O STRL BLT

## (undated) DEVICE — STANDARD HYPODERMIC NEEDLE,ALUMINUM HUB: Brand: MONOJECT

## (undated) DEVICE — CONSTELLATION VISION SYSTEM 5000 CPM ULTRAVIT PROBE STRAIGHT ENDOILLUMINATOR 25+ TOTALPLUS VITRECTOMY PAK EDGEPLUS BLADE VALVED ENTRY SYSTEM: Brand: CONSTELLATION, ULTRAVIT, 25+, TOTALPLUS, EDGEPLUS